# Patient Record
Sex: FEMALE | Race: WHITE | Employment: OTHER | URBAN - METROPOLITAN AREA
[De-identification: names, ages, dates, MRNs, and addresses within clinical notes are randomized per-mention and may not be internally consistent; named-entity substitution may affect disease eponyms.]

---

## 2017-03-06 ENCOUNTER — PATIENT OUTREACH (OUTPATIENT)
Dept: INTERNAL MEDICINE CLINIC | Facility: CLINIC | Age: 72
End: 2017-03-06

## 2017-03-20 ENCOUNTER — OFFICE VISIT (OUTPATIENT)
Dept: INTERNAL MEDICINE CLINIC | Facility: CLINIC | Age: 72
End: 2017-03-20

## 2017-03-20 VITALS
WEIGHT: 170 LBS | BODY MASS INDEX: 27.98 KG/M2 | TEMPERATURE: 98 F | OXYGEN SATURATION: 95 % | RESPIRATION RATE: 16 BRPM | DIASTOLIC BLOOD PRESSURE: 80 MMHG | HEART RATE: 80 BPM | SYSTOLIC BLOOD PRESSURE: 110 MMHG | HEIGHT: 65.5 IN

## 2017-03-20 DIAGNOSIS — S39.012A LUMBOSACRAL STRAIN, INITIAL ENCOUNTER: Primary | ICD-10-CM

## 2017-03-20 DIAGNOSIS — I71.9 AORTIC ANEURYSM WITHOUT RUPTURE, UNSPECIFIED PORTION OF AORTA (HCC): ICD-10-CM

## 2017-03-20 PROCEDURE — 99214 OFFICE O/P EST MOD 30 MIN: CPT | Performed by: STUDENT IN AN ORGANIZED HEALTH CARE EDUCATION/TRAINING PROGRAM

## 2017-03-20 RX ORDER — TRAMADOL HYDROCHLORIDE 50 MG/1
50 TABLET ORAL EVERY 6 HOURS PRN
Qty: 20 TABLET | Refills: 0 | Status: SHIPPED | OUTPATIENT
Start: 2017-03-20 | End: 2017-04-21

## 2017-03-20 RX ORDER — CYCLOBENZAPRINE HCL 10 MG
10 TABLET ORAL NIGHTLY
Qty: 30 TABLET | Refills: 0 | Status: SHIPPED | OUTPATIENT
Start: 2017-03-20 | End: 2017-04-21

## 2017-03-20 RX ORDER — METHYLPREDNISOLONE 4 MG/1
TABLET ORAL
Qty: 1 KIT | Refills: 0 | Status: SHIPPED | OUTPATIENT
Start: 2017-03-20 | End: 2017-04-21 | Stop reason: ALTCHOICE

## 2017-03-20 NOTE — PATIENT INSTRUCTIONS
Exercises to Strengthen Your Lower Back  Strong lower back and abdominal muscles work together to support your spine. The exercises below will help strengthen the lower back. It is important that you begin exercising slowly and increase levels gradually. Standin. Wall squats: Stand with your back against the wall. Move your feet about 12 inches away from the wall. Tighten your stomach muscles, and slowly bend your knees until they are at about a 45 degree angle. Do not go down too far.  Hold about 5 se 4. Abdominal crunch: Perform a pelvic tilt (above) flattening your lower back against the floor. Holding the tension in your abdominal muscles, take another breath and raise your shoulder blades off the ground (this is not a full sit-up).  Keep your head in © 0725-2549 The [address] [place] [address], [address]. All rights reserved. This information is not intended as a substitute for professional medical care. Always follow your healthcare professional's instructions.         Back Ex

## 2017-03-21 NOTE — PROGRESS NOTES
HPI:    Patient ID: Ceci Henry is a 73year old female who presents today for the evaluation of low back pain since Friday morning after sitting/sleeping in the wooden chair for over 8 hours. Low Back Pain  This is a new problem.  Episode onset: Prescriptions:  methylPREDNISolone (MEDROL) 4 MG Oral Tablet Therapy Pack As directed. Disp: 1 kit Rfl: 0   Cyclobenzaprine HCl 10 MG Oral Tab Take 1 tablet (10 mg total) by mouth nightly.  Disp: 30 tablet Rfl: 0   TraMADol HCl 50 MG Oral Tab Take 1 tablet deformity and no laceration. There is bilateral muscular tenderness and muscular spasm upon palpation in lumbosacral area. Straight leg raise test is positive bilaterally. Pain is non-radiating.    Neurological: She is alert and oriented to person, plac

## 2017-04-21 ENCOUNTER — OFFICE VISIT (OUTPATIENT)
Dept: INTERNAL MEDICINE CLINIC | Facility: CLINIC | Age: 72
End: 2017-04-21

## 2017-04-21 ENCOUNTER — LAB ENCOUNTER (OUTPATIENT)
Dept: LAB | Age: 72
End: 2017-04-21
Attending: PHYSICIAN ASSISTANT
Payer: MEDICARE

## 2017-04-21 VITALS
TEMPERATURE: 97 F | RESPIRATION RATE: 16 BRPM | SYSTOLIC BLOOD PRESSURE: 110 MMHG | HEART RATE: 80 BPM | WEIGHT: 170 LBS | HEIGHT: 65.5 IN | DIASTOLIC BLOOD PRESSURE: 78 MMHG | OXYGEN SATURATION: 97 % | BODY MASS INDEX: 27.98 KG/M2

## 2017-04-21 DIAGNOSIS — M47.812 CERVICAL SPINE ARTHRITIS: ICD-10-CM

## 2017-04-21 DIAGNOSIS — Z85.3 HISTORY OF BREAST CANCER: ICD-10-CM

## 2017-04-21 DIAGNOSIS — Z11.59 NEED FOR HEPATITIS C SCREENING TEST: ICD-10-CM

## 2017-04-21 DIAGNOSIS — G47.30 SLEEP APNEA, UNSPECIFIED TYPE: ICD-10-CM

## 2017-04-21 DIAGNOSIS — I71.2 THORACIC AORTIC ANEURYSM WITHOUT RUPTURE (HCC): ICD-10-CM

## 2017-04-21 DIAGNOSIS — I70.0 AORTIC ATHEROSCLEROSIS (HCC): ICD-10-CM

## 2017-04-21 DIAGNOSIS — E03.9 ACQUIRED HYPOTHYROIDISM: ICD-10-CM

## 2017-04-21 DIAGNOSIS — E78.2 MIXED HYPERLIPIDEMIA: ICD-10-CM

## 2017-04-21 DIAGNOSIS — M85.89 OSTEOPENIA OF MULTIPLE SITES: ICD-10-CM

## 2017-04-21 DIAGNOSIS — Z00.00 ENCOUNTER FOR ANNUAL HEALTH EXAMINATION: Primary | ICD-10-CM

## 2017-04-21 DIAGNOSIS — Z85.118 HISTORY OF LUNG CANCER: ICD-10-CM

## 2017-04-21 PROCEDURE — 87086 URINE CULTURE/COLONY COUNT: CPT | Performed by: PHYSICIAN ASSISTANT

## 2017-04-21 PROCEDURE — 80061 LIPID PANEL: CPT | Performed by: PHYSICIAN ASSISTANT

## 2017-04-21 PROCEDURE — 96160 PT-FOCUSED HLTH RISK ASSMT: CPT | Performed by: PHYSICIAN ASSISTANT

## 2017-04-21 PROCEDURE — 36415 COLL VENOUS BLD VENIPUNCTURE: CPT | Performed by: PHYSICIAN ASSISTANT

## 2017-04-21 PROCEDURE — 87186 SC STD MICRODIL/AGAR DIL: CPT | Performed by: PHYSICIAN ASSISTANT

## 2017-04-21 PROCEDURE — 81001 URINALYSIS AUTO W/SCOPE: CPT | Performed by: PHYSICIAN ASSISTANT

## 2017-04-21 PROCEDURE — G0439 PPPS, SUBSEQ VISIT: HCPCS | Performed by: PHYSICIAN ASSISTANT

## 2017-04-21 PROCEDURE — 84439 ASSAY OF FREE THYROXINE: CPT | Performed by: PHYSICIAN ASSISTANT

## 2017-04-21 PROCEDURE — 87077 CULTURE AEROBIC IDENTIFY: CPT | Performed by: PHYSICIAN ASSISTANT

## 2017-04-21 PROCEDURE — 86803 HEPATITIS C AB TEST: CPT | Performed by: PHYSICIAN ASSISTANT

## 2017-04-21 PROCEDURE — 80050 GENERAL HEALTH PANEL: CPT | Performed by: PHYSICIAN ASSISTANT

## 2017-04-21 RX ORDER — LEVOTHYROXINE SODIUM 175 UG/1
TABLET ORAL
Qty: 90 TABLET | Refills: 1 | Status: SHIPPED | OUTPATIENT
Start: 2017-04-21 | End: 2017-04-24 | Stop reason: DRUGHIGH

## 2017-04-21 RX ORDER — ATORVASTATIN CALCIUM 20 MG/1
20 TABLET, FILM COATED ORAL
Qty: 90 TABLET | Refills: 1 | Status: SHIPPED | OUTPATIENT
Start: 2017-04-21 | End: 2017-08-03

## 2017-04-21 NOTE — PROGRESS NOTES
HPI:   Yvonne Ireland is a 73year old female who presents for a MA (Medicare Advantage) Supervisit (Once per calendar year). Doing well  C/o history of sleep apnea- was not able to tolerate CPAP treatment in 1985.  However c/o fatigue and loud snor Sodium (SYNTHROID) 175 MCG Oral Tab TAKE 1 TABLET BEFORE       BREAKFAST   Atorvastatin Calcium 20 MG Oral Tab Take 1 tablet (20 mg total) by mouth once daily. Naproxen Sodium (ALEVE OR) Take by mouth.    aspirin 81 MG Oral Tab Take 1 tablet by mouth marci 110/78 mmHg  Pulse 80  Temp(Src) 96.5 °F (35.8 °C) (Oral)  Resp 16  Ht 65.5\"  Wt 170 lb  BMI 27.85 kg/m2  SpO2 97% Estimated body mass index is 27.85 kg/(m^2) as calculated from the following:    Height as of this encounter: 65.5\".     Weight as of this e Pneumococcal (Prevnar 13) 01/18/2016   • Pneumovax 23 (Lot Mgr) 01/14/2015   • Zoster (Shingles) 02/01/2011       ASSESSMENT AND OTHER RELEVANT CHRONIC CONDITIONS:   Timothy Luke is a 73year old female who presents for a Medicare Assessment.      PLAN Takes levothyroxine as directed, labs checked today cpm     History of breast cancer- utd with mammogram 8/2016, 1 year f/u due 8/2017     History of lung cancer s/p lobectomy 2012- utd with f/u sees Magdy, f/u when due     Osteopenia of multiple sites- had : No    Difficulty walking?: No    Difficulty dressing or bathing?: No    Problems with daily activities? : No    Memory Problems?: No      Fall/Risk Assessment     Do you have 3 or more medical conditions?: 1-Yes    Have you fallen in the last 12 months?: 06/22/2011 94     LDL CHOLESTROL (mg/dL)   Date Value   11/19/2013 63        EKG - w/ Initial Preventative Physical Exam only, or if medically necessary Electrocardiogram date01/02/2016       Colorectal Cancer Screening      Colonoscopy Screen every 10 y injectable drug abusers     Tetanus Toxoid  Only covered with a cut with metal- TD and TDaP Not covered by Medicare Part B No vaccine history found This may be covered with your prescription benefits, but Medicare does not cover unless Medically needed

## 2017-04-21 NOTE — PATIENT INSTRUCTIONS
Have labs drawn, fasting 8-10 hours prior (water before is ok).    Schedule sleep study when convenient    Sidra Logan's SCREENING SCHEDULE   Tests on this list are recommended by your physician but may not be covered, or covered at this frequency, by between ages 73-68) IPPE only No results found for this or any previous visit.  Limited to patients who meet one of the following criteria:   • Men who are 73-68 years old and have smoked more than 100 cigarettes in their lifetime   • Anyone with a family h Annually to at least age 76, and as needed after 76 Mammogram,1 Yr due on 08/15/2017 Please get this Mammogram regularly   Immunizations      Influenza  Covered Annually   Orders placed or performed in visit on 09/19/14  -FLU VAC NO PRSV 4 GHULAM 3 YRS+    Pl (the forms are also available in [facility])  [email or web address]  This link also has information from the [address] [place] [address] regarding Advance Directives.

## 2017-04-24 ENCOUNTER — TELEPHONE (OUTPATIENT)
Dept: INTERNAL MEDICINE CLINIC | Facility: CLINIC | Age: 72
End: 2017-04-24

## 2017-04-24 DIAGNOSIS — E03.9 ACQUIRED HYPOTHYROIDISM: Primary | ICD-10-CM

## 2017-04-24 RX ORDER — LEVOTHYROXINE SODIUM 0.15 MG/1
150 TABLET ORAL
Qty: 30 TABLET | Refills: 1 | Status: SHIPPED | OUTPATIENT
Start: 2017-04-24 | End: 2017-06-08

## 2017-04-24 NOTE — TELEPHONE ENCOUNTER
----- Message from Tyrone Gold PA-C sent at 4/23/2017  1:33 PM CDT -----  Please inform pt: synthroid dose is too high. Decrease to 150mcg daily and recheck TSH in 6 wks.   Otherwise blood work looks great. + urine culture please verify if pt has sym

## 2017-04-24 NOTE — TELEPHONE ENCOUNTER
Pt will call back to discuss. D/w pt results and recommendations. She expressed understanding. Pt denies UTI symptoms therefore no treatment given. Rx and lab order placed.

## 2017-05-24 ENCOUNTER — TELEPHONE (OUTPATIENT)
Dept: INTERNAL MEDICINE CLINIC | Facility: CLINIC | Age: 72
End: 2017-05-24

## 2017-05-24 NOTE — TELEPHONE ENCOUNTER
Pt has been taking a new dose of levothyroxine and wants to know if Eb Chandrakant would have her re-check lab levels? She is almost out of pills.

## 2017-05-24 NOTE — TELEPHONE ENCOUNTER
Pt due for repeat TSH on 6/5/17. Pt advised of this and was informed she does have 1 refill remaining on her current prescription of levothyroxine she just need to notify pharmacy of needed refill. Pt expressed understanding.

## 2017-05-31 ENCOUNTER — OFFICE VISIT (OUTPATIENT)
Dept: SLEEP CENTER | Facility: HOSPITAL | Age: 72
End: 2017-05-31
Attending: PHYSICIAN ASSISTANT
Payer: MEDICARE

## 2017-05-31 PROCEDURE — 95810 POLYSOM 6/> YRS 4/> PARAM: CPT

## 2017-06-02 NOTE — PROCEDURES
[address]       Accredited by the [facility] of [facility] ([facility]ASM)    PATIENT'S NAME:        Ivanna Lujan  ATTENDING PHYSICIAN:   Pancho Blue M.D.   REFERRING PHYSICIAN:   Sarah Oakley PA-C patient had a total of 146 obstructive apneic or hypopneic episodes yielding an overall apnea-hypopnea index of 24. There was worsening of TANNER in the supine position with a supine AHI of 44, a lateral AHI of 10.   The patient did have significant oxyhemogl

## 2017-06-05 NOTE — PROGRESS NOTES
Quick Note:    Pt will be notified by CPAP coordinator and f/u will be scheduled in sleep clinic.  ______

## 2017-06-06 ENCOUNTER — APPOINTMENT (OUTPATIENT)
Dept: LAB | Age: 72
End: 2017-06-06
Attending: PHYSICIAN ASSISTANT
Payer: MEDICARE

## 2017-06-06 DIAGNOSIS — E03.9 ACQUIRED HYPOTHYROIDISM: ICD-10-CM

## 2017-06-06 DIAGNOSIS — M85.89 OSTEOPENIA OF MULTIPLE SITES: ICD-10-CM

## 2017-06-06 PROCEDURE — 36415 COLL VENOUS BLD VENIPUNCTURE: CPT | Performed by: PHYSICIAN ASSISTANT

## 2017-06-06 PROCEDURE — 82306 VITAMIN D 25 HYDROXY: CPT | Performed by: PHYSICIAN ASSISTANT

## 2017-06-06 PROCEDURE — 84443 ASSAY THYROID STIM HORMONE: CPT | Performed by: PHYSICIAN ASSISTANT

## 2017-06-08 ENCOUNTER — TELEPHONE (OUTPATIENT)
Dept: INTERNAL MEDICINE CLINIC | Facility: CLINIC | Age: 72
End: 2017-06-08

## 2017-06-08 DIAGNOSIS — E03.9 ACQUIRED HYPOTHYROIDISM: Primary | ICD-10-CM

## 2017-06-08 RX ORDER — LEVOTHYROXINE SODIUM 0.15 MG/1
150 TABLET ORAL
Qty: 90 TABLET | Refills: 1 | Status: SHIPPED | OUTPATIENT
Start: 2017-06-08 | End: 2017-08-03

## 2017-06-28 ENCOUNTER — OFFICE VISIT (OUTPATIENT)
Dept: SLEEP CENTER | Facility: HOSPITAL | Age: 72
End: 2017-06-28
Attending: INTERNAL MEDICINE
Payer: MEDICARE

## 2017-06-28 PROCEDURE — 95811 POLYSOM 6/>YRS CPAP 4/> PARM: CPT

## 2017-07-05 NOTE — PROCEDURES
[address] [address]       Accredited by the [facility] of [facility] (AASM)    PATIENT'S NAME:        Amanda Crefadumo  ATTENDING PHYSICIAN:   Estela Sosa M.D. REFERRING PHYSICIAN:   Estela Sosa M.D.   SOFIA 27%.    CPAP TITRATION:  The patient was initiated on CPAP at 7 cm of water. On this setting, patient appeared to have control of all obstructive apneic and hypopneic episodes as well as snoring.   While on this setting, patient achieved prolonged periods

## 2017-08-03 DIAGNOSIS — Z12.39 SCREENING FOR MALIGNANT NEOPLASM OF BREAST: Primary | ICD-10-CM

## 2017-08-03 DIAGNOSIS — E03.9 ACQUIRED HYPOTHYROIDISM: ICD-10-CM

## 2017-08-03 DIAGNOSIS — E78.2 MIXED HYPERLIPIDEMIA: ICD-10-CM

## 2017-08-03 RX ORDER — ATORVASTATIN CALCIUM 20 MG/1
20 TABLET, FILM COATED ORAL
Qty: 90 TABLET | Refills: 1 | Status: SHIPPED | OUTPATIENT
Start: 2017-08-03 | End: 2017-12-19

## 2017-08-03 RX ORDER — LEVOTHYROXINE SODIUM 0.15 MG/1
150 TABLET ORAL
Qty: 90 TABLET | Refills: 1 | Status: SHIPPED | OUTPATIENT
Start: 2017-08-03 | End: 2017-12-19

## 2017-08-03 NOTE — TELEPHONE ENCOUNTER
Pt requesting 90 day supply refills of Levothyroxine Sodium 150 MCG Oral Tab and Atorvastatin Calcium 20 MG Oral Tab - send to [place] home delivery on file.    Pt also requesting annual order for mammogram.

## 2017-08-16 ENCOUNTER — HOSPITAL ENCOUNTER (OUTPATIENT)
Dept: MAMMOGRAPHY | Facility: HOSPITAL | Age: 72
Discharge: HOME | End: 2017-08-16
Attending: INTERNAL MEDICINE
Payer: MEDICARE

## 2017-08-16 ENCOUNTER — TELEPHONE (OUTPATIENT)
Dept: INTERNAL MEDICINE CLINIC | Facility: CLINIC | Age: 72
End: 2017-08-16

## 2017-08-16 DIAGNOSIS — Z12.39 SCREENING FOR MALIGNANT NEOPLASM OF BREAST: ICD-10-CM

## 2017-08-16 NOTE — TELEPHONE ENCOUNTER
Spoke with Tim Bloom from mammography and she states she explained to patient that a screening vs diagnostic test were different and the diagnostic test called for a longer appointment time.  She offered to reschedule patient for today at 2pm for diagnostic te

## 2017-08-16 NOTE — TELEPHONE ENCOUNTER
Pt went to [facility] this morning for her mammogram appt, but then noticed that it was for a screening mammogram - she said per her insurance they will not cover the screening, only diagnostic mammograms.  We changed the order right away but pt was to

## 2017-08-31 ENCOUNTER — HOSPITAL ENCOUNTER (OUTPATIENT)
Dept: MAMMOGRAPHY | Facility: HOSPITAL | Age: 72
Discharge: HOME | End: 2017-08-31
Attending: INTERNAL MEDICINE
Payer: MEDICARE

## 2017-08-31 ENCOUNTER — TELEPHONE (OUTPATIENT)
Dept: INTERNAL MEDICINE CLINIC | Facility: CLINIC | Age: 72
End: 2017-08-31

## 2017-08-31 DIAGNOSIS — N63.10 BREAST MASS, RIGHT: Primary | ICD-10-CM

## 2017-08-31 DIAGNOSIS — Z85.3 PERSONAL HISTORY OF MALIGNANT NEOPLASM OF BREAST: ICD-10-CM

## 2017-08-31 PROCEDURE — 76642 ULTRASOUND BREAST LIMITED: CPT | Performed by: INTERNAL MEDICINE

## 2017-08-31 PROCEDURE — 77066 DX MAMMO INCL CAD BI: CPT | Performed by: INTERNAL MEDICINE

## 2017-08-31 PROCEDURE — 77062 BREAST TOMOSYNTHESIS BI: CPT | Performed by: INTERNAL MEDICINE

## 2017-08-31 NOTE — IMAGING NOTE
Asssisted Dr. Ramirez March with recommendation for a right US breast biopsy for nodule. Emotional and educational support provided. Written information provided to Peterson Kam.  Our breast center schedulers will call pt within 72 hours to schedule an appo

## 2017-08-31 NOTE — TELEPHONE ENCOUNTER
Spoke with Dr. Edis Alvarado and he is requesting a US order for breast biopsy of right breast.     CONCLUSION:        1.  There has been interval development of a new mass at the 8:00 position of the right breast, this would be amenable to ultrasound-gu

## 2017-09-01 ENCOUNTER — HOSPITAL ENCOUNTER (OUTPATIENT)
Dept: MAMMOGRAPHY | Facility: HOSPITAL | Age: 72
Discharge: HOME | End: 2017-09-01
Attending: INTERNAL MEDICINE
Payer: MEDICARE

## 2017-09-01 DIAGNOSIS — N63.10 BREAST MASS, RIGHT: ICD-10-CM

## 2017-09-01 PROCEDURE — 77065 DX MAMMO INCL CAD UNI: CPT | Performed by: INTERNAL MEDICINE

## 2017-09-01 PROCEDURE — 88342 IMHCHEM/IMCYTCHM 1ST ANTB: CPT | Performed by: INTERNAL MEDICINE

## 2017-09-01 PROCEDURE — 88377 M/PHMTRC ALYS ISHQUANT/SEMIQ: CPT | Performed by: INTERNAL MEDICINE

## 2017-09-01 PROCEDURE — 88305 TISSUE EXAM BY PATHOLOGIST: CPT | Performed by: INTERNAL MEDICINE

## 2017-09-01 PROCEDURE — 19083 BX BREAST 1ST LESION US IMAG: CPT | Performed by: INTERNAL MEDICINE

## 2017-09-01 PROCEDURE — 88360 TUMOR IMMUNOHISTOCHEM/MANUAL: CPT | Performed by: INTERNAL MEDICINE

## 2017-09-05 ENCOUNTER — TELEPHONE (OUTPATIENT)
Dept: MAMMOGRAPHY | Facility: HOSPITAL | Age: 72
End: 2017-09-05

## 2017-09-05 ENCOUNTER — TELEPHONE (OUTPATIENT)
Dept: INTERNAL MEDICINE CLINIC | Facility: CLINIC | Age: 72
End: 2017-09-05

## 2017-09-05 DIAGNOSIS — N63.10 MASS OF BREAST, RIGHT: Primary | ICD-10-CM

## 2017-09-05 NOTE — TELEPHONE ENCOUNTER
Kali Mann MD  General Surgery  [facility]  Phone: [phone]  Fax: [phone]    Left message for Radiology with Dr. Sugey Biswas recommendations. Advised that they let us know once patient is aware.   Referral placed

## 2017-09-05 NOTE — TELEPHONE ENCOUNTER
Telephoned Shayy Beatty and name,  verified with pt. Notified Shayy Beatty of right US breast biopsy result of DCIS. Explained that we do not know a stage at this point, but explained the Grade.  Also explained that additional tests are pending

## 2017-09-05 NOTE — TELEPHONE ENCOUNTER
VOICEMAIL: pt had rt breast biopsy done and it was positive for cancer, what surgeon would Dr. Anjali Vasquez recommend pt to see, they will call pt back and let her know, did not leave a phone #

## 2017-09-08 ENCOUNTER — TELEPHONE (OUTPATIENT)
Dept: HEMATOLOGY/ONCOLOGY | Facility: HOSPITAL | Age: 72
End: 2017-09-08

## 2017-09-08 NOTE — TELEPHONE ENCOUNTER
Patient phoned back and discussed newly diagnosed breast cancer. Introduced myself and explained my role as the breast navigator.  Explained the role of the physicians on her breast cancer care team. Reviewed over pathology report and discussed the type of

## 2017-09-11 ENCOUNTER — TELEPHONE (OUTPATIENT)
Dept: INTERNAL MEDICINE CLINIC | Facility: CLINIC | Age: 72
End: 2017-09-11

## 2017-09-11 DIAGNOSIS — C50.911 MALIGNANT NEOPLASM OF RIGHT FEMALE BREAST, UNSPECIFIED ESTROGEN RECEPTOR STATUS, UNSPECIFIED SITE OF BREAST (HCC): Primary | ICD-10-CM

## 2017-09-11 NOTE — TELEPHONE ENCOUNTER
Referral placed. LMOVM informing pt referral has been placed and our office will contact her once approved.

## 2017-09-11 NOTE — TELEPHONE ENCOUNTER
Pt needs a referral to be placed to Dr Gamaliel Lorenzo @ [facility], her phone #[phone]. She won't be going to Dr. Serenity Baker.

## 2017-09-13 PROBLEM — G47.33 OSA (OBSTRUCTIVE SLEEP APNEA): Status: ACTIVE | Noted: 2017-09-13

## 2017-09-19 ENCOUNTER — TELEPHONE (OUTPATIENT)
Dept: INTERNAL MEDICINE CLINIC | Facility: CLINIC | Age: 72
End: 2017-09-19

## 2017-09-19 NOTE — TELEPHONE ENCOUNTER
Patient states she needs clearance to proceed with breast surgery at [facility]. She states she has completed labs and EKG at [facility] and only needs H&P. Patient has been scheduled with Paloma Cuellar for tomorrow. She will bring in surgery paperwork.

## 2017-09-19 NOTE — TELEPHONE ENCOUNTER
Patient called and stated she needs to speak to RN asap regarding a letter that is supposed to be sent to [facility] for her surgery. She indicated she needs to speak to RN now.

## 2017-09-20 ENCOUNTER — OFFICE VISIT (OUTPATIENT)
Dept: INTERNAL MEDICINE CLINIC | Facility: CLINIC | Age: 72
End: 2017-09-20

## 2017-09-20 VITALS
SYSTOLIC BLOOD PRESSURE: 124 MMHG | DIASTOLIC BLOOD PRESSURE: 72 MMHG | OXYGEN SATURATION: 97 % | BODY MASS INDEX: 28.13 KG/M2 | TEMPERATURE: 98 F | HEART RATE: 68 BPM | WEIGHT: 175 LBS | HEIGHT: 66 IN | RESPIRATION RATE: 16 BRPM

## 2017-09-20 DIAGNOSIS — G47.33 OSA (OBSTRUCTIVE SLEEP APNEA): ICD-10-CM

## 2017-09-20 DIAGNOSIS — Z01.818 PREOP EXAMINATION: Primary | ICD-10-CM

## 2017-09-20 DIAGNOSIS — Z23 FLU VACCINE NEED: ICD-10-CM

## 2017-09-20 DIAGNOSIS — I71.2 THORACIC AORTIC ANEURYSM WITHOUT RUPTURE (HCC): ICD-10-CM

## 2017-09-20 DIAGNOSIS — C50.411 MALIGNANT NEOPLASM OF UPPER-OUTER QUADRANT OF RIGHT FEMALE BREAST, UNSPECIFIED ESTROGEN RECEPTOR STATUS (HCC): ICD-10-CM

## 2017-09-20 PROCEDURE — 90653 IIV ADJUVANT VACCINE IM: CPT | Performed by: PHYSICIAN ASSISTANT

## 2017-09-20 PROCEDURE — G0008 ADMIN INFLUENZA VIRUS VAC: HCPCS | Performed by: PHYSICIAN ASSISTANT

## 2017-09-20 PROCEDURE — 99214 OFFICE O/P EST MOD 30 MIN: CPT | Performed by: PHYSICIAN ASSISTANT

## 2017-09-20 NOTE — H&P
Shu Greenwood is a 81year old year old female who presents for a pre-operative physical exam.  Patient is to have bilateral mastectomy with reconstruction, to be done by Dr. Michelle Cardoza, Dr. Denisse Tineo and Dr. Radha Selby at [facility] surgical history that includes removal of lung,lobectomy (6.2012); other surgical history; other surgical history; supriya biopsy stereotactic nodule 2 site bilat (2005); chemotherapy (1985); lumpectomy left (2005); lumpectomy right (1677); radiation left (200 analgesics. EXAM:    /72   Pulse 68   Temp 98.4 °F (36.9 °C) (Oral)   Resp 16   Ht 66\"   Wt 175 lb   SpO2 97%   BMI 28.25 kg/m²   GENERAL: well developed, well nourished, in no apparent distress  SKIN: no rashes, no suspicious lesions.   Warm and dr vaccine need- flu shot given today  Malignant neoplasm of upper-outer quadrant of right female breast, unspecified estrogen receptor status (hcc) - managed by surgery and oncology, planning for mastectomy as noted  Thoracic aortic aneurysm without rupture:

## 2017-12-11 ENCOUNTER — MED REC SCAN ONLY (OUTPATIENT)
Dept: INTERNAL MEDICINE CLINIC | Facility: CLINIC | Age: 72
End: 2017-12-11

## 2017-12-19 ENCOUNTER — TELEPHONE (OUTPATIENT)
Dept: INTERNAL MEDICINE CLINIC | Facility: CLINIC | Age: 72
End: 2017-12-19

## 2017-12-19 DIAGNOSIS — E03.9 ACQUIRED HYPOTHYROIDISM: ICD-10-CM

## 2017-12-19 DIAGNOSIS — E78.2 MIXED HYPERLIPIDEMIA: ICD-10-CM

## 2017-12-19 RX ORDER — LEVOTHYROXINE SODIUM 0.15 MG/1
150 TABLET ORAL
Qty: 90 TABLET | Refills: 0 | Status: SHIPPED | OUTPATIENT
Start: 2017-12-19 | End: 2018-05-03

## 2017-12-19 RX ORDER — ATORVASTATIN CALCIUM 20 MG/1
20 TABLET, FILM COATED ORAL
Qty: 90 TABLET | Refills: 0 | Status: SHIPPED | OUTPATIENT
Start: 2017-12-19 | End: 2018-05-03

## 2017-12-19 NOTE — TELEPHONE ENCOUNTER
Patient walked up to the , and is asking if Clemens Shone will approve her RX's: Levothyroxine, Atorvastatin, and Synthroid; qty 90 to be sent to [address] [place] [address]. She is seated outside, and is unable to make an appointment due to going to chemo therapy.

## 2017-12-19 NOTE — TELEPHONE ENCOUNTER
Delgado Goodwin to refill the prescriptions. The pt is to FU in the office when she can some time next month. The pt was informed of this. The pt is aware and will schedule within the next month.

## 2018-01-22 ENCOUNTER — TELEPHONE (OUTPATIENT)
Dept: INTERNAL MEDICINE CLINIC | Facility: CLINIC | Age: 73
End: 2018-01-22

## 2018-01-22 NOTE — TELEPHONE ENCOUNTER
Please call patient  Patient was wondering if she needed to come in for her med check on Tuesday. She would like to combine the med check with her HRA is doing some cancer treatment right know doesn't have a lot of extra time.  Please all before 11:00 toda

## 2018-01-22 NOTE — TELEPHONE ENCOUNTER
Discussed with the pt in detail the insurance will not cover an appointment for an HRA until April. The pt was transferred to the  to schedule the HRA. Ok per Gönyû to wait for an appointment until that time.

## 2018-04-05 ENCOUNTER — MED REC SCAN ONLY (OUTPATIENT)
Dept: INTERNAL MEDICINE CLINIC | Facility: CLINIC | Age: 73
End: 2018-04-05

## 2018-04-20 ENCOUNTER — HOSPITAL ENCOUNTER (OUTPATIENT)
Dept: ULTRASOUND IMAGING | Facility: HOSPITAL | Age: 73
Discharge: HOME | End: 2018-04-20
Attending: OBSTETRICS & GYNECOLOGY
Payer: MEDICARE

## 2018-04-20 DIAGNOSIS — N94.89 ADNEXAL MASS: ICD-10-CM

## 2018-04-20 DIAGNOSIS — Z85.3 HX: BREAST CANCER: ICD-10-CM

## 2018-04-20 PROCEDURE — 76856 US EXAM PELVIC COMPLETE: CPT | Performed by: OBSTETRICS & GYNECOLOGY

## 2018-04-20 PROCEDURE — 76830 TRANSVAGINAL US NON-OB: CPT | Performed by: OBSTETRICS & GYNECOLOGY

## 2018-04-23 ENCOUNTER — LAB ENCOUNTER (OUTPATIENT)
Dept: LAB | Age: 73
End: 2018-04-23
Attending: INTERNAL MEDICINE
Payer: MEDICARE

## 2018-04-23 DIAGNOSIS — E03.9 ACQUIRED HYPOTHYROIDISM: ICD-10-CM

## 2018-04-23 DIAGNOSIS — E78.2 MIXED HYPERLIPIDEMIA: ICD-10-CM

## 2018-04-23 PROCEDURE — 81001 URINALYSIS AUTO W/SCOPE: CPT | Performed by: INTERNAL MEDICINE

## 2018-04-23 PROCEDURE — 80053 COMPREHEN METABOLIC PANEL: CPT | Performed by: INTERNAL MEDICINE

## 2018-04-23 PROCEDURE — 36415 COLL VENOUS BLD VENIPUNCTURE: CPT | Performed by: INTERNAL MEDICINE

## 2018-04-23 PROCEDURE — 80061 LIPID PANEL: CPT | Performed by: INTERNAL MEDICINE

## 2018-04-25 ENCOUNTER — OFFICE VISIT (OUTPATIENT)
Dept: INTERNAL MEDICINE CLINIC | Facility: CLINIC | Age: 73
End: 2018-04-25

## 2018-04-25 VITALS
RESPIRATION RATE: 16 BRPM | DIASTOLIC BLOOD PRESSURE: 78 MMHG | TEMPERATURE: 98 F | WEIGHT: 150 LBS | HEIGHT: 66 IN | HEART RATE: 70 BPM | SYSTOLIC BLOOD PRESSURE: 120 MMHG | OXYGEN SATURATION: 98 % | BODY MASS INDEX: 24.11 KG/M2

## 2018-04-25 DIAGNOSIS — M47.812 CERVICAL SPINE ARTHRITIS: ICD-10-CM

## 2018-04-25 DIAGNOSIS — E78.2 MIXED HYPERLIPIDEMIA: ICD-10-CM

## 2018-04-25 DIAGNOSIS — I71.2 THORACIC AORTIC ANEURYSM WITHOUT RUPTURE (HCC): ICD-10-CM

## 2018-04-25 DIAGNOSIS — G47.33 OSA (OBSTRUCTIVE SLEEP APNEA): ICD-10-CM

## 2018-04-25 DIAGNOSIS — Z00.00 ENCOUNTER FOR ANNUAL HEALTH EXAMINATION: Primary | ICD-10-CM

## 2018-04-25 DIAGNOSIS — J44.9 CHRONIC OBSTRUCTIVE PULMONARY DISEASE, UNSPECIFIED COPD TYPE (HCC): ICD-10-CM

## 2018-04-25 DIAGNOSIS — Z85.118 HISTORY OF LUNG CANCER: ICD-10-CM

## 2018-04-25 DIAGNOSIS — C50.411 MALIGNANT NEOPLASM OF UPPER-OUTER QUADRANT OF RIGHT BREAST IN FEMALE, ESTROGEN RECEPTOR NEGATIVE (HCC): ICD-10-CM

## 2018-04-25 DIAGNOSIS — E03.9 ACQUIRED HYPOTHYROIDISM: ICD-10-CM

## 2018-04-25 DIAGNOSIS — Z17.1 MALIGNANT NEOPLASM OF UPPER-OUTER QUADRANT OF RIGHT BREAST IN FEMALE, ESTROGEN RECEPTOR NEGATIVE (HCC): ICD-10-CM

## 2018-04-25 DIAGNOSIS — M85.89 OSTEOPENIA OF MULTIPLE SITES: ICD-10-CM

## 2018-04-25 DIAGNOSIS — D05.10 DUCTAL CARCINOMA IN SITU (DCIS) OF BREAST, UNSPECIFIED LATERALITY: ICD-10-CM

## 2018-04-25 PROCEDURE — 96160 PT-FOCUSED HLTH RISK ASSMT: CPT | Performed by: PHYSICIAN ASSISTANT

## 2018-04-25 PROCEDURE — G0439 PPPS, SUBSEQ VISIT: HCPCS | Performed by: PHYSICIAN ASSISTANT

## 2018-04-25 RX ORDER — OMEPRAZOLE 20 MG/1
CAPSULE, DELAYED RELEASE ORAL
COMMUNITY
Start: 2018-02-08 | End: 2019-04-08

## 2018-04-25 NOTE — PATIENT INSTRUCTIONS
Gissell Logan's SCREENING SCHEDULE   Tests on this list are recommended by your physician but may not be covered, or covered at this frequency, by your insurer. Please check with your insurance carrier before scheduling to verify coverage.    Digna Flores aneurysm screening (once between ages 73-68) IPPE only No results found for this or any previous visit.  Limited to patients who meet one of the following criteria:   • Men who are 73-68 years old and have smoked more than 100 cigarettes in their lifetime age 76, and as needed after 76 Mammogram,1 Yr due on 09/01/2018 Please get this Mammogram regularly   Immunizations      Influenza  Covered Annually   Orders placed or performed in visit on 09/19/14  -FLU VAC NO PRSV 4 GHULAM 3 YRS+    Please get every year available in [address])  [email or web address]  This link also has information from the [address] [place] [address] regarding Advance Directives.

## 2018-04-25 NOTE — PROGRESS NOTES
HPI:   Jolynn Montes is a 81year old female who presents for a MA (Medicare Advantage) Supervisit (Once per calendar year). No complaints.  Undergoing chemo for breast cancer with Westley Rodrigues, doing fairly well  Her last annual assessment has been over 1 as Breast Navigator (ONCOLOGY)    Patient Active Problem List:     Aortic aneurysm ([address]- stable on CT from 8/15/17     Hyperlipidemia- on atorvastatin, LDL at goal, tolerates statin well no side effects     Malignant neoplasm of upper-outer quadrant of ri tablet by mouth daily. Multiple Vitamin (DAILY MULTIVITAMIN OR) Take 1 Tab by mouth daily. MEDICAL INFORMATION:   She  has a past medical history of Aortic aneurysm ([facility] [facility] Breast CA ([facility] [facility] (1985); Lipid screening (5/7/2012);  Malignant neoplasm of charlene following:    Height as of this encounter: 66\". Weight as of this encounter: 150 lb.     Medicare Hearing Assessment  (Required for AWV/SWV)    Whispered Voice       Visual Acuity                           General Appearance:  Alert, cooperative, no dis visit:    Encounter for annual health examination    Mixed hyperlipidemia    TANNER (obstructive sleep apnea)    Cervical spine arthritis (HCC)    Thoracic aortic aneurysm without rupture (HCC)    Osteopenia of multiple sites    Chronic obstructive pulmonary 4/25/2018     General Health     In the past six months, have you lost more than 10 pounds without trying?: 2 - No  Has your appetite been poor?: No  How does the patient maintain a good energy level?: Other  How would you describe your daily physical acti risk There are no preventive care reminders to display for this patient. Update Health Maintenance if applicable    Chlamydia  Annually if high risk No results found for: CHLAMYDIA No flowsheet data found.     Screening Mammogram      Mammogram Annually to

## 2018-04-27 PROBLEM — D05.10 DCIS (DUCTAL CARCINOMA IN SITU): Status: ACTIVE | Noted: 2017-10-30

## 2018-05-03 DIAGNOSIS — E03.9 ACQUIRED HYPOTHYROIDISM: ICD-10-CM

## 2018-05-03 DIAGNOSIS — E78.2 MIXED HYPERLIPIDEMIA: ICD-10-CM

## 2018-05-03 RX ORDER — ATORVASTATIN CALCIUM 20 MG/1
20 TABLET, FILM COATED ORAL
Qty: 90 TABLET | Refills: 1 | Status: SHIPPED | OUTPATIENT
Start: 2018-05-03 | End: 2018-05-07

## 2018-05-03 RX ORDER — LEVOTHYROXINE SODIUM 0.15 MG/1
150 TABLET ORAL
Qty: 90 TABLET | Refills: 1 | Status: SHIPPED | OUTPATIENT
Start: 2018-05-03 | End: 2018-05-07

## 2018-05-07 ENCOUNTER — TELEPHONE (OUTPATIENT)
Dept: INTERNAL MEDICINE CLINIC | Facility: CLINIC | Age: 73
End: 2018-05-07

## 2018-05-07 DIAGNOSIS — E03.9 ACQUIRED HYPOTHYROIDISM: ICD-10-CM

## 2018-05-07 DIAGNOSIS — E78.2 MIXED HYPERLIPIDEMIA: ICD-10-CM

## 2018-05-07 RX ORDER — LEVOTHYROXINE SODIUM 0.15 MG/1
150 TABLET ORAL
Qty: 90 TABLET | Refills: 1 | Status: SHIPPED | OUTPATIENT
Start: 2018-05-07 | End: 2019-01-04

## 2018-05-07 RX ORDER — ATORVASTATIN CALCIUM 20 MG/1
20 TABLET, FILM COATED ORAL
Qty: 90 TABLET | Refills: 1 | Status: SHIPPED | OUTPATIENT
Start: 2018-05-07 | End: 2019-01-04

## 2018-05-07 NOTE — TELEPHONE ENCOUNTER
New prescription request from Debi Gutierrez for atorvastatin 20 MG and Levothyrns 0.15mg tab. Form in triage.

## 2018-06-04 ENCOUNTER — TELEPHONE (OUTPATIENT)
Dept: INTERNAL MEDICINE CLINIC | Facility: CLINIC | Age: 73
End: 2018-06-04

## 2018-07-27 ENCOUNTER — OFFICE VISIT (OUTPATIENT)
Dept: INTERNAL MEDICINE CLINIC | Facility: CLINIC | Age: 73
End: 2018-07-27
Payer: MEDICARE

## 2018-07-27 VITALS
HEART RATE: 69 BPM | RESPIRATION RATE: 16 BRPM | WEIGHT: 143 LBS | TEMPERATURE: 98 F | HEIGHT: 66 IN | BODY MASS INDEX: 22.98 KG/M2 | DIASTOLIC BLOOD PRESSURE: 68 MMHG | SYSTOLIC BLOOD PRESSURE: 112 MMHG

## 2018-07-27 DIAGNOSIS — Z17.1 MALIGNANT NEOPLASM OF UPPER-OUTER QUADRANT OF RIGHT BREAST IN FEMALE, ESTROGEN RECEPTOR NEGATIVE (HCC): ICD-10-CM

## 2018-07-27 DIAGNOSIS — J34.89 RHINORRHEA: ICD-10-CM

## 2018-07-27 DIAGNOSIS — C50.411 MALIGNANT NEOPLASM OF UPPER-OUTER QUADRANT OF RIGHT BREAST IN FEMALE, ESTROGEN RECEPTOR NEGATIVE (HCC): ICD-10-CM

## 2018-07-27 DIAGNOSIS — T28.0XXA BURN OF ORAL MUCOSA: Primary | ICD-10-CM

## 2018-07-27 PROCEDURE — 99214 OFFICE O/P EST MOD 30 MIN: CPT | Performed by: PHYSICIAN ASSISTANT

## 2018-07-27 RX ORDER — CHLORHEXIDINE GLUCONATE 0.12 MG/ML
RINSE ORAL
Qty: 118 ML | Refills: 1 | Status: SHIPPED | OUTPATIENT
Start: 2018-07-27 | End: 2019-04-08

## 2018-07-27 RX ORDER — FLUTICASONE PROPIONATE 50 MCG
1 SPRAY, SUSPENSION (ML) NASAL DAILY
Qty: 1 BOTTLE | Refills: 0 | Status: SHIPPED | OUTPATIENT
Start: 2018-07-27 | End: 2019-04-08

## 2018-07-27 NOTE — PROGRESS NOTES
HPI:    Patient ID: Rolly Ramirez is a 81year old female.   Patient presents with:  Lesion: room 3, pt ate a piece of pizza that was too hot and top of mouth got burned and hasn't healed, feels like a scab     HPI  States she has had a scab on the roof Nursing note and vitals reviewed. Constitutional: She is oriented to person, place, and time. She appears well-developed and well-nourished. No distress. HENT:   Head: Normocephalic and atraumatic. Nose: Nose normal. No mucosal edema.  Right sinus exh

## 2018-07-30 NOTE — PATIENT INSTRUCTIONS
Use mouth rinse for 7 days, or until mouth is healed completely.  Follow up if not resolved within 7 days  Try flonase for drainage, use once a day

## 2018-08-24 ENCOUNTER — TELEPHONE (OUTPATIENT)
Dept: INTERNAL MEDICINE CLINIC | Facility: CLINIC | Age: 73
End: 2018-08-24

## 2018-08-24 NOTE — TELEPHONE ENCOUNTER
Order dated 2017 by Dr. Sri Guallpa for [facility] [phone]. Pt given this information.

## 2018-10-10 ENCOUNTER — HOSPITAL ENCOUNTER (OUTPATIENT)
Dept: ULTRASOUND IMAGING | Facility: HOSPITAL | Age: 73
Discharge: HOME | End: 2018-10-10
Attending: OBSTETRICS & GYNECOLOGY
Payer: MEDICARE

## 2018-10-10 DIAGNOSIS — N94.89 ADNEXAL MASS: ICD-10-CM

## 2018-10-10 PROCEDURE — 76856 US EXAM PELVIC COMPLETE: CPT | Performed by: OBSTETRICS & GYNECOLOGY

## 2018-10-10 PROCEDURE — 76830 TRANSVAGINAL US NON-OB: CPT | Performed by: OBSTETRICS & GYNECOLOGY

## 2018-10-26 ENCOUNTER — MED REC SCAN ONLY (OUTPATIENT)
Dept: INTERNAL MEDICINE CLINIC | Facility: CLINIC | Age: 73
End: 2018-10-26

## 2019-01-04 DIAGNOSIS — E78.2 MIXED HYPERLIPIDEMIA: ICD-10-CM

## 2019-01-04 DIAGNOSIS — E03.9 ACQUIRED HYPOTHYROIDISM: ICD-10-CM

## 2019-01-04 RX ORDER — ATORVASTATIN CALCIUM 20 MG/1
20 TABLET, FILM COATED ORAL
Qty: 90 TABLET | Refills: 1 | Status: SHIPPED | OUTPATIENT
Start: 2019-01-04 | End: 2019-07-19

## 2019-01-04 RX ORDER — LEVOTHYROXINE SODIUM 0.15 MG/1
150 TABLET ORAL
Qty: 90 TABLET | Refills: 1 | Status: SHIPPED | OUTPATIENT
Start: 2019-01-04 | End: 2019-07-19

## 2019-01-04 NOTE — TELEPHONE ENCOUNTER
Patient walked up to the , and is requesting Levothyroxine and Atorvastatin medications qty 3 month supply to be sent to [facility] order.

## 2019-01-29 ENCOUNTER — TELEPHONE (OUTPATIENT)
Dept: INTERNAL MEDICINE CLINIC | Facility: CLINIC | Age: 74
End: 2019-01-29

## 2019-01-29 NOTE — TELEPHONE ENCOUNTER
Did confirm with the pt Rx for atorvastatin 20mg and Levothyroxine 150mcg were both sent to [address] [place] [address] on 1/4/2019. Receipt confirmed by the pharmacy on 12:31 PM.     The pt verbalized understanding and will FU with Aeziana about the refills.

## 2019-01-29 NOTE — TELEPHONE ENCOUNTER
Patient called YAIR on VM wanting a call back because she was here 10 days ago, and was told her RX was faxed to [place] [facility], but is now having trouble with [employer]. Please call her back.

## 2019-03-27 ENCOUNTER — PATIENT OUTREACH (OUTPATIENT)
Dept: INTERNAL MEDICINE CLINIC | Facility: CLINIC | Age: 74
End: 2019-03-27

## 2019-04-08 ENCOUNTER — OFFICE VISIT (OUTPATIENT)
Dept: INTERNAL MEDICINE CLINIC | Facility: CLINIC | Age: 74
End: 2019-04-08
Payer: MEDICARE

## 2019-04-08 VITALS
SYSTOLIC BLOOD PRESSURE: 114 MMHG | DIASTOLIC BLOOD PRESSURE: 66 MMHG | RESPIRATION RATE: 16 BRPM | HEIGHT: 66 IN | HEART RATE: 80 BPM | TEMPERATURE: 99 F | BODY MASS INDEX: 21.86 KG/M2 | WEIGHT: 136 LBS

## 2019-04-08 DIAGNOSIS — J22 ACUTE LOWER RESPIRATORY INFECTION: Primary | ICD-10-CM

## 2019-04-08 PROCEDURE — 99213 OFFICE O/P EST LOW 20 MIN: CPT | Performed by: PHYSICIAN ASSISTANT

## 2019-04-08 RX ORDER — AMOXICILLIN AND CLAVULANATE POTASSIUM 875; 125 MG/1; MG/1
1 TABLET, FILM COATED ORAL 2 TIMES DAILY
Qty: 20 TABLET | Refills: 0 | Status: SHIPPED | OUTPATIENT
Start: 2019-04-08 | End: 2019-04-18

## 2019-04-08 RX ORDER — BENZONATATE 200 MG/1
200 CAPSULE ORAL 3 TIMES DAILY PRN
Qty: 20 CAPSULE | Refills: 0 | Status: SHIPPED | OUTPATIENT
Start: 2019-04-08 | End: 2019-04-30 | Stop reason: ALTCHOICE

## 2019-04-08 NOTE — PROGRESS NOTES
HPI:   Choco Cunha is a 72year old female who presents for upper respiratory symptoms for  10  days. Patient reports cough, chest congestion, worse with lying down, some shortness of breath, sputum is darker and slightly thick.  Patient denies [place] Payment Alcohol/week: 0.0 oz      Comment: 1 drink on New Year's Dayanna    Drug use: No        REVIEW OF SYSTEMS:   GENERAL: feeling poorly.    SKIN: no rashes  EYES: denies blurred vision, eye irritation or discharge  HEENT: congested; no difficulty swallowing or

## 2019-04-26 ENCOUNTER — MED REC SCAN ONLY (OUTPATIENT)
Dept: INTERNAL MEDICINE CLINIC | Facility: CLINIC | Age: 74
End: 2019-04-26

## 2019-04-30 ENCOUNTER — OFFICE VISIT (OUTPATIENT)
Dept: INTERNAL MEDICINE CLINIC | Facility: CLINIC | Age: 74
End: 2019-04-30
Payer: MEDICARE

## 2019-04-30 VITALS
WEIGHT: 136 LBS | SYSTOLIC BLOOD PRESSURE: 136 MMHG | HEART RATE: 70 BPM | BODY MASS INDEX: 21.86 KG/M2 | OXYGEN SATURATION: 96 % | TEMPERATURE: 98 F | RESPIRATION RATE: 16 BRPM | HEIGHT: 66 IN | DIASTOLIC BLOOD PRESSURE: 80 MMHG

## 2019-04-30 DIAGNOSIS — C50.511 MALIGNANT NEOPLASM OF LOWER-OUTER QUADRANT OF RIGHT BREAST OF FEMALE, ESTROGEN RECEPTOR NEGATIVE (HCC): ICD-10-CM

## 2019-04-30 DIAGNOSIS — Z17.1 MALIGNANT NEOPLASM OF LOWER-OUTER QUADRANT OF RIGHT BREAST OF FEMALE, ESTROGEN RECEPTOR NEGATIVE (HCC): ICD-10-CM

## 2019-04-30 DIAGNOSIS — Z00.00 ENCOUNTER FOR ANNUAL HEALTH EXAMINATION: Primary | ICD-10-CM

## 2019-04-30 DIAGNOSIS — J34.0 NASAL SEPTUM ULCERATION: ICD-10-CM

## 2019-04-30 DIAGNOSIS — C34.90 MALIGNANT NEOPLASM OF LUNG, UNSPECIFIED LATERALITY, UNSPECIFIED PART OF LUNG (HCC): ICD-10-CM

## 2019-04-30 DIAGNOSIS — E03.9 ACQUIRED HYPOTHYROIDISM: ICD-10-CM

## 2019-04-30 DIAGNOSIS — G47.33 OSA (OBSTRUCTIVE SLEEP APNEA): ICD-10-CM

## 2019-04-30 DIAGNOSIS — I71.2 THORACIC AORTIC ANEURYSM WITHOUT RUPTURE (HCC): ICD-10-CM

## 2019-04-30 DIAGNOSIS — M47.812 FACET ARTHRITIS OF CERVICAL REGION: ICD-10-CM

## 2019-04-30 DIAGNOSIS — E78.2 MIXED HYPERLIPIDEMIA: ICD-10-CM

## 2019-04-30 DIAGNOSIS — J44.9 CHRONIC OBSTRUCTIVE PULMONARY DISEASE, UNSPECIFIED COPD TYPE (HCC): ICD-10-CM

## 2019-04-30 DIAGNOSIS — D05.10 DUCTAL CARCINOMA IN SITU (DCIS) OF BREAST, UNSPECIFIED LATERALITY: ICD-10-CM

## 2019-04-30 DIAGNOSIS — N18.30 CKD (CHRONIC KIDNEY DISEASE) STAGE 3, GFR 30-59 ML/MIN (HCC): ICD-10-CM

## 2019-04-30 DIAGNOSIS — Z91.89 AT RISK FOR INFECTIOUS DISEASE DUE TO RECENT FOREIGN TRAVEL: ICD-10-CM

## 2019-04-30 DIAGNOSIS — Z85.118 HISTORY OF LUNG CANCER: ICD-10-CM

## 2019-04-30 DIAGNOSIS — M85.89 OSTEOPENIA OF MULTIPLE SITES: ICD-10-CM

## 2019-04-30 PROCEDURE — 96160 PT-FOCUSED HLTH RISK ASSMT: CPT | Performed by: PHYSICIAN ASSISTANT

## 2019-04-30 PROCEDURE — 99397 PER PM REEVAL EST PAT 65+ YR: CPT | Performed by: PHYSICIAN ASSISTANT

## 2019-04-30 PROCEDURE — G0439 PPPS, SUBSEQ VISIT: HCPCS | Performed by: PHYSICIAN ASSISTANT

## 2019-04-30 NOTE — PROGRESS NOTES
HPI:   Yvonne Ireland is a 72year old female who presents for a MA (Medicare Advantage) Supervisit (Once per calendar year). Has had chronic nasal septum ulceration x 2 years, saw ENT specialist who gave her a cream that did not work.  C/o constant Use      Smoking status: Former Smoker        Types: Cigarettes        Quit date: 1998        Years since quittin.3      Smokeless tobacco: Never Used         CAGE Alcohol screening   Sloane Craw was screened for Alcohol abuse and had a scor Levothyroxine Sodium 150 MCG Oral Tab Take 1 tablet (150 mcg total) by mouth before breakfast.   aspirin 81 MG Oral Tab Take 1 tablet by mouth daily. Multiple Vitamin (DAILY MULTIVITAMIN OR) Take 1 Tab by mouth daily.       MEDICAL INFORMATION:   She  h history  ALL/ASTHMA: denies hx of allergy or asthma    EXAM:   /80   Pulse 70   Temp 98.3 °F (36.8 °C) (Oral)   Resp 16   Ht 66\"   Wt 136 lb   SpO2 96%   Breastfeeding?  No   BMI 21.95 kg/m²  Estimated body mass index is 21.95 kg/m² as calculated fro and older ([address]3283) 09/20/2017   • FLUZONE 3 Yrs+ Quad Prsv Free 0.5 ml (84129) 09/19/2014   • Fluvirin, 3 Years & >, Im 09/19/2014   • Fluzone Vaccine Medicare () 10/04/2018   • Influenza 09/25/2013, 11/09/2016, 09/20/2017   • Pneumococcal (Prevnar 13) URINALYSIS WITH CULTURE REFLEX; Future    TANNER (obstructive sleep apnea)    Malignant neoplasm of lung, unspecified laterality, unspecified part of lung (HCC)    Acquired hypothyroidism  -     TSH W REFLEX TO FREE T4; Future    Osteopenia of multiple sit GLUCOSE (mg/dL)   Date Value   11/19/2013 78   03/09/2011 90          Cardiovascular Disease Screening     LDL Annually LDL Cholesterol (mg/dL)   Date Value   04/23/2018 34     LDL-CHOLESTEROL (mg/dL (calc))   Date Value   06/22/2011 94     LDL CHOLEST who received Factor VIII or IX concentrates   Clients of institutions for the mentally retarded   Persons who live in the same house as a HepB virus carrier   Homosexual men   Illicit injectable drug abusers     Tetanus Toxoid  Only covered with a cut with

## 2019-04-30 NOTE — PATIENT INSTRUCTIONS
Try vaseline on nasal ulcer twice daily, apply with qtip  If not improved, try saline nasal spray, twice daily to each nostril   May also consider children's Claritin over-the-counter, once daily in the evening  Itzel Logan's SCREENING SCHEDULE   Nesha non-screening if indicated for medical reasons Electrocardiogram date Routine EKG is not a screening covered service except at the [place] to Medicare Visit    Abdominal aortic aneurysm screening (once between ages 73-68) IPPE only No results found for Carmen Jaimes patient. Chlamydia  Annually if high risk No results found for: CHLAMYDIA No flowsheet data found.     Screening Mammogram      Mammogram    Recommend Annually to at least age 76, and as needed after 76 Mammogram due on 09/01/2018 Please get this Mammog of Advance Directives. It also has the State forms available on it's website for anyone to review and print using their home computer and printer. (the forms are also available in [address])  [email or web address]  This link also has information from the Am

## 2019-05-06 ENCOUNTER — TELEPHONE (OUTPATIENT)
Dept: INTERNAL MEDICINE CLINIC | Facility: CLINIC | Age: 74
End: 2019-05-06

## 2019-05-06 NOTE — TELEPHONE ENCOUNTER
Patient called and stated she had seen Sandra Nash, and recovered from cancer; however, she wants to go to [place], [place], but Sandra Nash stated she will get back to the patient in regards if she can go due to the measles outbreak.  Patient would like a call back to know

## 2019-05-06 NOTE — TELEPHONE ENCOUNTER
Abbi Felton recommends completing labs as advised to check for immunity status. Labs have been ordered.      TCB

## 2019-05-06 NOTE — TELEPHONE ENCOUNTER
Second attempt to reach patient. Left detailed message with recommendations. Advised she can call the office for any questions.

## 2019-05-08 ENCOUNTER — LAB ENCOUNTER (OUTPATIENT)
Dept: LAB | Age: 74
End: 2019-05-08
Attending: PHYSICIAN ASSISTANT
Payer: MEDICARE

## 2019-05-08 DIAGNOSIS — E03.9 ACQUIRED HYPOTHYROIDISM: ICD-10-CM

## 2019-05-08 DIAGNOSIS — E78.2 MIXED HYPERLIPIDEMIA: ICD-10-CM

## 2019-05-08 DIAGNOSIS — Z91.89 AT RISK FOR INFECTIOUS DISEASE DUE TO RECENT FOREIGN TRAVEL: ICD-10-CM

## 2019-05-08 PROCEDURE — 84443 ASSAY THYROID STIM HORMONE: CPT

## 2019-05-08 PROCEDURE — 80061 LIPID PANEL: CPT

## 2019-05-08 PROCEDURE — 86765 RUBEOLA ANTIBODY: CPT

## 2019-05-08 PROCEDURE — 81003 URINALYSIS AUTO W/O SCOPE: CPT

## 2019-05-08 PROCEDURE — 86762 RUBELLA ANTIBODY: CPT

## 2019-05-08 PROCEDURE — 36415 COLL VENOUS BLD VENIPUNCTURE: CPT

## 2019-05-08 PROCEDURE — 85025 COMPLETE CBC W/AUTO DIFF WBC: CPT

## 2019-05-08 PROCEDURE — 80053 COMPREHEN METABOLIC PANEL: CPT

## 2019-05-08 PROCEDURE — 86735 MUMPS ANTIBODY: CPT

## 2019-06-07 ENCOUNTER — OFFICE VISIT (OUTPATIENT)
Dept: INTERNAL MEDICINE CLINIC | Facility: CLINIC | Age: 74
End: 2019-06-07
Payer: MEDICARE

## 2019-06-07 VITALS
TEMPERATURE: 98 F | DIASTOLIC BLOOD PRESSURE: 74 MMHG | RESPIRATION RATE: 16 BRPM | OXYGEN SATURATION: 97 % | HEIGHT: 66 IN | SYSTOLIC BLOOD PRESSURE: 122 MMHG | HEART RATE: 70 BPM | WEIGHT: 137 LBS | BODY MASS INDEX: 22.02 KG/M2

## 2019-06-07 DIAGNOSIS — H65.192 OTHER NON-RECURRENT ACUTE NONSUPPURATIVE OTITIS MEDIA OF LEFT EAR: Primary | ICD-10-CM

## 2019-06-07 DIAGNOSIS — H10.11 SEASONAL AND PERENNIAL ALLERGIC RHINOCONJUNCTIVITIS OF RIGHT EYE: ICD-10-CM

## 2019-06-07 DIAGNOSIS — J30.2 SEASONAL AND PERENNIAL ALLERGIC RHINOCONJUNCTIVITIS OF RIGHT EYE: ICD-10-CM

## 2019-06-07 DIAGNOSIS — J30.89 SEASONAL AND PERENNIAL ALLERGIC RHINOCONJUNCTIVITIS OF RIGHT EYE: ICD-10-CM

## 2019-06-07 PROCEDURE — 99213 OFFICE O/P EST LOW 20 MIN: CPT | Performed by: NURSE PRACTITIONER

## 2019-06-07 RX ORDER — AMOXICILLIN AND CLAVULANATE POTASSIUM 875; 125 MG/1; MG/1
1 TABLET, FILM COATED ORAL 2 TIMES DAILY
Qty: 20 TABLET | Refills: 0 | Status: SHIPPED | OUTPATIENT
Start: 2019-06-07 | End: 2019-06-17

## 2019-06-07 NOTE — PATIENT INSTRUCTIONS
2 eye drops twice a day- [facility]   Take antibiotic with food as directed  Rest   Salt water gargles

## 2019-06-07 NOTE — PROGRESS NOTES
HPI:    Patient ID: Peterson Kam is a 72year old female. Patient presents with:  Ear Pain: sore throat, left ear pain, watery eyes      Patient got home on Tuesday morning from a trip in [facility].  She stated developing ear pain on Wednesday and has Laterality Date   • CHEMOTHERAPY  1985    right breast ca   • LUMPECTOMY LEFT  2005   • LUMPECTOMY RIGHT  1985   • SIM BIOPSY STEREOTACTIC NODULE 2 SITE BILAT  2005   • MASTECTOMY MODIFIED RADICAL Bilateral 10/19/2017    with immediate reconstruction   • O pain, difficulty breathing    Lab Results   Component Value Date    GLU 84 05/08/2019    BUN 18 05/08/2019    BUNCREA 17.6 05/08/2019    CREATSERUM 1.02 05/08/2019    ANIONGAP 3 05/08/2019    GFR 50 (L) 04/21/2017    GFRNAA 55 (L) 05/08/2019    GFRAA 64 05 posterior oropharyngeal erythema present. No posterior oropharyngeal edema. Eyes: Pupils are equal, round, and reactive to light. EOM are normal. Right conjunctiva is injected. Right conjunctiva has no hemorrhage. Left conjunctiva is not injected.  Left c

## 2019-06-11 ENCOUNTER — OFFICE VISIT (OUTPATIENT)
Dept: INTERNAL MEDICINE CLINIC | Facility: CLINIC | Age: 74
End: 2019-06-11
Payer: MEDICARE

## 2019-06-11 ENCOUNTER — TELEPHONE (OUTPATIENT)
Dept: INTERNAL MEDICINE CLINIC | Facility: CLINIC | Age: 74
End: 2019-06-11

## 2019-06-11 VITALS
SYSTOLIC BLOOD PRESSURE: 110 MMHG | BODY MASS INDEX: 22.02 KG/M2 | RESPIRATION RATE: 16 BRPM | OXYGEN SATURATION: 97 % | HEART RATE: 78 BPM | HEIGHT: 66 IN | WEIGHT: 137 LBS | DIASTOLIC BLOOD PRESSURE: 68 MMHG | TEMPERATURE: 98 F

## 2019-06-11 DIAGNOSIS — B37.3 VAGINAL YEAST INFECTION: Primary | ICD-10-CM

## 2019-06-11 DIAGNOSIS — R30.0 DYSURIA: ICD-10-CM

## 2019-06-11 PROCEDURE — 99213 OFFICE O/P EST LOW 20 MIN: CPT | Performed by: NURSE PRACTITIONER

## 2019-06-11 PROCEDURE — 81003 URINALYSIS AUTO W/O SCOPE: CPT | Performed by: NURSE PRACTITIONER

## 2019-06-11 RX ORDER — FLUCONAZOLE 150 MG/1
150 TABLET ORAL DAILY
Qty: 3 TABLET | Refills: 0 | Status: SHIPPED | OUTPATIENT
Start: 2019-06-11 | End: 2019-06-24 | Stop reason: ALTCHOICE

## 2019-06-11 NOTE — TELEPHONE ENCOUNTER
Per Tiim Christensen, have her hold statin while taking diflucan. Called patient and left detailed message with above instructions.  Advised she call the office with any questions

## 2019-06-11 NOTE — PROGRESS NOTES
HPI:    Patient ID: Nathalie Crews is a 72year old female. Patient presents with:  Urinary: burning during urination, no discharge      Patient is on currently on Augmentin.  She started developing vaginal itchiness and burning with urination yesterd History    Socioeconomic History      Marital status:       Spouse name: Not on file      Number of children: Not on file      Years of education: Not on file      Highest education level: Not on file    Tobacco Use      Smoking status: [employer] GLOBULIN 3.5 05/08/2019    ALBGLOBRAT 1.8 03/09/2011     05/08/2019    K 4.8 05/08/2019     05/08/2019    CO2 29.0 05/08/2019     Lab Results   Component Value Date    WBC 6.4 05/08/2019    RBC 4.79 05/08/2019    HGB 13.9 05/08/2019    HCT 4 Margaret Cannon, W/O SCOPE      Zhen Goldsmith APRN

## 2019-06-17 ENCOUNTER — TELEPHONE (OUTPATIENT)
Dept: INTERNAL MEDICINE CLINIC | Facility: CLINIC | Age: 74
End: 2019-06-17

## 2019-06-17 DIAGNOSIS — H65.192 OTHER NON-RECURRENT ACUTE NONSUPPURATIVE OTITIS MEDIA OF LEFT EAR: ICD-10-CM

## 2019-06-17 RX ORDER — PREDNISONE 20 MG/1
40 TABLET ORAL DAILY
Qty: 14 TABLET | Refills: 0 | Status: SHIPPED | OUTPATIENT
Start: 2019-06-17 | End: 2019-06-24 | Stop reason: ALTCHOICE

## 2019-06-17 RX ORDER — AMOXICILLIN AND CLAVULANATE POTASSIUM 875; 125 MG/1; MG/1
1 TABLET, FILM COATED ORAL 2 TIMES DAILY
Qty: 14 TABLET | Refills: 0 | Status: SHIPPED | OUTPATIENT
Start: 2019-06-17 | End: 2019-06-24 | Stop reason: ALTCHOICE

## 2019-06-17 NOTE — TELEPHONE ENCOUNTER
Patient called and requested another refill on Amoxicillin to be sent to [facility] on file. Please call because she thinks she may still need more than prescribed.

## 2019-06-17 NOTE — TELEPHONE ENCOUNTER
Spoke with pt she reports still having ear pain and a sore throat. Per Dr. Ffoana Memory extend Augmentin BID for 7 additional days and add on Prednisone 40mg daily x 7 days. D/w pt above recommendations she expressed understanding. Rxs sent.

## 2019-06-24 ENCOUNTER — HOSPITAL ENCOUNTER (OUTPATIENT)
Dept: GENERAL RADIOLOGY | Age: 74
Discharge: HOME | End: 2019-06-24
Attending: INTERNAL MEDICINE
Payer: MEDICARE

## 2019-06-24 ENCOUNTER — OFFICE VISIT (OUTPATIENT)
Dept: INTERNAL MEDICINE CLINIC | Facility: CLINIC | Age: 74
End: 2019-06-24
Payer: MEDICARE

## 2019-06-24 ENCOUNTER — TELEPHONE (OUTPATIENT)
Dept: INTERNAL MEDICINE CLINIC | Facility: CLINIC | Age: 74
End: 2019-06-24

## 2019-06-24 VITALS
OXYGEN SATURATION: 98 % | DIASTOLIC BLOOD PRESSURE: 78 MMHG | HEIGHT: 66 IN | TEMPERATURE: 98 F | HEART RATE: 58 BPM | WEIGHT: 134.5 LBS | BODY MASS INDEX: 21.62 KG/M2 | SYSTOLIC BLOOD PRESSURE: 128 MMHG | RESPIRATION RATE: 18 BRPM

## 2019-06-24 DIAGNOSIS — J44.9 CHRONIC OBSTRUCTIVE PULMONARY DISEASE, UNSPECIFIED COPD TYPE (HCC): Primary | ICD-10-CM

## 2019-06-24 DIAGNOSIS — R06.02 SOB (SHORTNESS OF BREATH) ON EXERTION: ICD-10-CM

## 2019-06-24 DIAGNOSIS — R06.02 SHORTNESS OF BREATH: ICD-10-CM

## 2019-06-24 DIAGNOSIS — H69.82 DYSFUNCTION OF LEFT EUSTACHIAN TUBE: Primary | ICD-10-CM

## 2019-06-24 PROBLEM — J34.0 NASAL SEPTUM ULCERATION: Status: RESOLVED | Noted: 2018-06-14 | Resolved: 2019-06-24

## 2019-06-24 PROCEDURE — 99214 OFFICE O/P EST MOD 30 MIN: CPT | Performed by: INTERNAL MEDICINE

## 2019-06-24 PROCEDURE — 71046 X-RAY EXAM CHEST 2 VIEWS: CPT | Performed by: INTERNAL MEDICINE

## 2019-06-24 RX ORDER — FLUTICASONE PROPIONATE 50 MCG
2 SPRAY, SUSPENSION (ML) NASAL DAILY
Qty: 1 BOTTLE | Refills: 0 | Status: SHIPPED | OUTPATIENT
Start: 2019-06-24 | End: 2020-01-15 | Stop reason: ALTCHOICE

## 2019-06-24 RX ORDER — BUDESONIDE AND FORMOTEROL FUMARATE DIHYDRATE 80; 4.5 UG/1; UG/1
1 AEROSOL RESPIRATORY (INHALATION) 2 TIMES DAILY
Qty: 1 INHALER | Refills: 2 | Status: SHIPPED | OUTPATIENT
Start: 2019-06-24 | End: 2020-01-15 | Stop reason: ALTCHOICE

## 2019-06-24 RX ORDER — DOXYCYCLINE HYCLATE 100 MG
100 TABLET ORAL 2 TIMES DAILY
Qty: 20 TABLET | Refills: 0 | Status: SHIPPED | OUTPATIENT
Start: 2019-06-24 | End: 2020-01-15 | Stop reason: ALTCHOICE

## 2019-06-24 NOTE — TELEPHONE ENCOUNTER
----- Message from Molly Cheadle, MD sent at 6/24/2019  2:47 PM CDT -----  No infection. Copd like findings seen on xray. However dx done through PFTs. Recommend seeing dr Glo Burgess (pulmonary).  Recommend symbicort 80 mg bid for sob related to possible copd

## 2019-06-24 NOTE — PROGRESS NOTES
HPI:    Patient ID: Stephanie Pinzon is a 72year old female. Ear Pain    There is pain in the left ear. This is a recurrent problem. The current episode started 1 to 4 weeks ago. The problem occurs constantly. The problem has been gradually worsening. Allergies: Thiamazole              ITCHING    Comment:Achy, bone pain, difficulty breathing   PHYSICAL EXAM:   Physical Exam   Constitutional: She appears well-developed and well-nourished. No distress.    HENT:   Right Ear: Hearing, tympanic membrane Nare route daily.        Imaging & Referrals:  None       #[id number]

## 2019-06-24 NOTE — TELEPHONE ENCOUNTER
Ear pain, sore throat, hurts when she takes a breath pt has been on 2 rounds of antibiotics.   Appt given for today for re-eval.

## 2019-06-24 NOTE — TELEPHONE ENCOUNTER
Pt states she is following up from last week for a viral infection (breathing issues, ears are sore) Thinks maybe she does need an xray that was recommended , call back

## 2019-06-24 NOTE — TELEPHONE ENCOUNTER
D/w pt results and recommendations. She reports she is established with a surgeon that did surgery on her lungs and will reach out to that provider to see if they will evaluate her for COPD. Rx sent.

## 2019-07-03 ENCOUNTER — APPOINTMENT (OUTPATIENT)
Dept: LAB | Facility: HOSPITAL | Age: 74
End: 2019-07-03
Attending: INTERNAL MEDICINE
Payer: MEDICARE

## 2019-07-03 DIAGNOSIS — R06.02 SOB (SHORTNESS OF BREATH): ICD-10-CM

## 2019-07-03 LAB — D-DIMER: 1.15 UG/ML FEU (ref ?–0.71)

## 2019-07-03 PROCEDURE — 36415 COLL VENOUS BLD VENIPUNCTURE: CPT

## 2019-07-03 PROCEDURE — 85378 FIBRIN DEGRADE SEMIQUANT: CPT

## 2019-07-19 DIAGNOSIS — E78.2 MIXED HYPERLIPIDEMIA: ICD-10-CM

## 2019-07-19 DIAGNOSIS — E03.9 ACQUIRED HYPOTHYROIDISM: ICD-10-CM

## 2019-07-19 RX ORDER — ATORVASTATIN CALCIUM 20 MG/1
20 TABLET, FILM COATED ORAL
Qty: 90 TABLET | Refills: 1 | Status: SHIPPED | OUTPATIENT
Start: 2019-07-19 | End: 2020-01-15

## 2019-07-19 RX ORDER — LEVOTHYROXINE SODIUM 0.15 MG/1
150 TABLET ORAL
Qty: 90 TABLET | Refills: 1 | Status: SHIPPED | OUTPATIENT
Start: 2019-07-19 | End: 2019-10-29

## 2019-10-22 DIAGNOSIS — E03.9 ACQUIRED HYPOTHYROIDISM: ICD-10-CM

## 2019-10-22 RX ORDER — LEVOTHYROXINE SODIUM 0.15 MG/1
150 TABLET ORAL
Qty: 90 TABLET | Refills: 1 | OUTPATIENT
Start: 2019-10-22

## 2019-10-29 DIAGNOSIS — E03.9 ACQUIRED HYPOTHYROIDISM: ICD-10-CM

## 2019-10-29 RX ORDER — LEVOTHYROXINE SODIUM 0.15 MG/1
150 TABLET ORAL
Qty: 90 TABLET | Refills: 1 | Status: SHIPPED | OUTPATIENT
Start: 2019-10-29 | End: 2020-01-15

## 2019-10-29 NOTE — TELEPHONE ENCOUNTER
Fax request from [facility] for a refill on Levothyroxine 150 mcg qty 90 . Please see fax in triage.

## 2019-12-04 ENCOUNTER — HOSPITAL ENCOUNTER (OUTPATIENT)
Dept: ULTRASOUND IMAGING | Facility: HOSPITAL | Age: 74
Discharge: HOME | End: 2019-12-04
Attending: OBSTETRICS & GYNECOLOGY
Payer: MEDICARE

## 2019-12-04 DIAGNOSIS — R19.00 PELVIC MASS: ICD-10-CM

## 2019-12-04 PROCEDURE — 76830 TRANSVAGINAL US NON-OB: CPT | Performed by: OBSTETRICS & GYNECOLOGY

## 2019-12-04 PROCEDURE — 76856 US EXAM PELVIC COMPLETE: CPT | Performed by: OBSTETRICS & GYNECOLOGY

## 2019-12-23 ENCOUNTER — MED REC SCAN ONLY (OUTPATIENT)
Dept: INTERNAL MEDICINE CLINIC | Facility: CLINIC | Age: 74
End: 2019-12-23

## 2020-01-02 ENCOUNTER — HOSPITAL ENCOUNTER (OUTPATIENT)
Dept: CT IMAGING | Facility: HOSPITAL | Age: 75
Discharge: HOME | End: 2020-01-02
Attending: OBSTETRICS & GYNECOLOGY
Payer: MEDICARE

## 2020-01-02 DIAGNOSIS — C34.90 MALIGNANT NEOPLASM OF LUNG, UNSPECIFIED LATERALITY, UNSPECIFIED PART OF LUNG (HCC): ICD-10-CM

## 2020-01-02 DIAGNOSIS — R19.00 PELVIC MASS: ICD-10-CM

## 2020-01-02 LAB — CREAT BLD-MCNC: 1 MG/DL (ref 0.55–1.02)

## 2020-01-02 PROCEDURE — 71260 CT THORAX DX C+: CPT | Performed by: OBSTETRICS & GYNECOLOGY

## 2020-01-02 PROCEDURE — 82565 ASSAY OF CREATININE: CPT

## 2020-01-02 PROCEDURE — 74177 CT ABD & PELVIS W/CONTRAST: CPT | Performed by: OBSTETRICS & GYNECOLOGY

## 2020-01-15 ENCOUNTER — OFFICE VISIT (OUTPATIENT)
Dept: INTERNAL MEDICINE CLINIC | Facility: CLINIC | Age: 75
End: 2020-01-15
Payer: MEDICARE

## 2020-01-15 VITALS
BODY MASS INDEX: 22.5 KG/M2 | WEIGHT: 140 LBS | OXYGEN SATURATION: 98 % | SYSTOLIC BLOOD PRESSURE: 132 MMHG | TEMPERATURE: 98 F | HEIGHT: 66 IN | DIASTOLIC BLOOD PRESSURE: 72 MMHG | RESPIRATION RATE: 14 BRPM | HEART RATE: 78 BPM

## 2020-01-15 DIAGNOSIS — E78.2 MIXED HYPERLIPIDEMIA: ICD-10-CM

## 2020-01-15 DIAGNOSIS — E03.9 ACQUIRED HYPOTHYROIDISM: ICD-10-CM

## 2020-01-15 DIAGNOSIS — N90.89 VULVAR LESION: Primary | ICD-10-CM

## 2020-01-15 PROCEDURE — 99213 OFFICE O/P EST LOW 20 MIN: CPT | Performed by: PHYSICIAN ASSISTANT

## 2020-01-15 RX ORDER — CEPHALEXIN 500 MG/1
500 CAPSULE ORAL 3 TIMES DAILY
Qty: 21 CAPSULE | Refills: 0 | Status: SHIPPED | OUTPATIENT
Start: 2020-01-15 | End: 2020-01-23

## 2020-01-15 RX ORDER — ATORVASTATIN CALCIUM 20 MG/1
20 TABLET, FILM COATED ORAL
Qty: 90 TABLET | Refills: 1 | Status: SHIPPED | OUTPATIENT
Start: 2020-01-15 | End: 2020-09-10

## 2020-01-15 RX ORDER — LEVOTHYROXINE SODIUM 0.15 MG/1
150 TABLET ORAL
Qty: 90 TABLET | Refills: 1 | Status: SHIPPED | OUTPATIENT
Start: 2020-01-15 | End: 2020-09-10

## 2020-01-15 NOTE — PROGRESS NOTES
HPI:    Patient ID: Megha Lucas is a 69year old female. Pt c/o blisters in genital area, x 1 week. Was swollen at first; still has drainage. Noticed at first d/t blood; looked like a large pimple, then later that day noticed pain.  Yellow thin alvarado Thiamazole              ITCHING    Comment:Achy, bone pain, difficulty breathing   PHYSICAL EXAM:   Physical Exam   Constitutional: She is oriented to person, place, and time. She appears well-developed and well-nourished. Neck: Normal range of motion.

## 2020-01-15 NOTE — TELEPHONE ENCOUNTER
Patient is at the pharmacy now, and needs Atorvastatin and Levothyroxine Sodium sent to [facility] in [place].

## 2020-01-23 ENCOUNTER — TELEPHONE (OUTPATIENT)
Dept: INTERNAL MEDICINE CLINIC | Facility: CLINIC | Age: 75
End: 2020-01-23

## 2020-01-23 DIAGNOSIS — N90.89 VULVAR LESION: Primary | ICD-10-CM

## 2020-01-23 RX ORDER — CEPHALEXIN 500 MG/1
500 CAPSULE ORAL 3 TIMES DAILY
Qty: 21 CAPSULE | Refills: 0 | Status: SHIPPED | OUTPATIENT
Start: 2020-01-23 | End: 2020-02-18 | Stop reason: ALTCHOICE

## 2020-01-23 NOTE — TELEPHONE ENCOUNTER
Patient called and stated she was seen for an eye infection, and it has not subsided. Please advise.

## 2020-01-23 NOTE — TELEPHONE ENCOUNTER
PT. Reports seen in office on 1/15/2020 and Dx. With Vulvar lesion. Pt. States, \" Discharge isLess than it was before but still there, and swelling is still there and very uncomfortable to sit and was informed to call back if still having issues. \"

## 2020-01-31 ENCOUNTER — MED REC SCAN ONLY (OUTPATIENT)
Dept: INTERNAL MEDICINE CLINIC | Facility: CLINIC | Age: 75
End: 2020-01-31

## 2020-04-09 ENCOUNTER — VIRTUAL PHONE E/M (OUTPATIENT)
Dept: INTERNAL MEDICINE CLINIC | Facility: CLINIC | Age: 75
End: 2020-04-09
Payer: MEDICARE

## 2020-04-09 DIAGNOSIS — H10.33 ACUTE BACTERIAL CONJUNCTIVITIS OF BOTH EYES: Primary | ICD-10-CM

## 2020-04-09 PROCEDURE — 99441 PHONE E/M BY PHYS 5-10 MIN: CPT | Performed by: NURSE PRACTITIONER

## 2020-04-09 RX ORDER — ERYTHROMYCIN 5 MG/G
OINTMENT OPHTHALMIC
Qty: 3.5 G | Refills: 0 | Status: SHIPPED | OUTPATIENT
Start: 2020-04-09 | End: 2020-08-17 | Stop reason: ALTCHOICE

## 2020-04-09 NOTE — PROGRESS NOTES
Virtual Telephone Check-In    Megha Lucas verbally consents to a Virtual/Telephone Check-In visit on 04/09/20. Patient understands and accepts financial responsibility for any deductible, co-insurance and/or co-pays associated with this service.

## 2020-06-08 ENCOUNTER — VIRTUAL PHONE E/M (OUTPATIENT)
Dept: INTERNAL MEDICINE CLINIC | Facility: CLINIC | Age: 75
End: 2020-06-08
Payer: MEDICARE

## 2020-06-08 ENCOUNTER — TELEPHONE (OUTPATIENT)
Dept: INTERNAL MEDICINE CLINIC | Facility: CLINIC | Age: 75
End: 2020-06-08

## 2020-06-08 DIAGNOSIS — K52.9 CHRONIC DIARRHEA: Primary | ICD-10-CM

## 2020-06-08 PROCEDURE — 99442 PHONE E/M BY PHYS 11-20 MIN: CPT | Performed by: INTERNAL MEDICINE

## 2020-06-08 RX ORDER — CHOLESTYRAMINE 4 G/9G
4 POWDER, FOR SUSPENSION ORAL 2 TIMES DAILY WITH MEALS
Qty: 180 EACH | Refills: 1 | Status: SHIPPED | OUTPATIENT
Start: 2020-06-08 | End: 2020-07-08

## 2020-06-08 NOTE — TELEPHONE ENCOUNTER
Pt looking for an Anti Diarrhea Medication. Interested in getting this medication   chitra avery (SP?) Pt wasn't sure how to spell the medication. Pt is experiencing frequent accidents and she was told by her brother that this works.  They have a

## 2020-06-08 NOTE — PROGRESS NOTES
Virtual Telephone Check-In    Aristides King verbally consents to a Virtual/Telephone Check-In visit on 06/08/20. Patient has been referred to the [facility] website at [email or web address] to review the yearly Consent to Treat document.     Patient under

## 2020-06-08 NOTE — PROGRESS NOTES
HPI:    Patient ID: Cassy Valentine is a 69year old female. Diarrhea    This is a chronic problem. Episode onset: > 20 years. The problem occurs 2 to 4 times per day. The problem has been unchanged. The stool consistency is described as watery.  The p affect. Her behavior is normal.              ASSESSMENT/PLAN:   Chronic diarrhea  (primary encounter diagnosis)  - trial of questran 4 mg bid with meals  No orders of the defined types were placed in this encounter.       Meds This Visit:  Requested Abdiaziz Acosta

## 2020-06-08 NOTE — PATIENT INSTRUCTIONS
How the Colon Works  The colon (large intestine) is a muscular tube that forms the last part of the digestive tract. It absorbs water and stores food waste. The colon is about 5 feet long. The rectum is the last nearly 5 inches of the colon.      How guerrero

## 2020-08-07 ENCOUNTER — TELEPHONE (OUTPATIENT)
Dept: INTERNAL MEDICINE CLINIC | Facility: CLINIC | Age: 75
End: 2020-08-07

## 2020-08-07 DIAGNOSIS — G47.33 OSA (OBSTRUCTIVE SLEEP APNEA): ICD-10-CM

## 2020-08-07 DIAGNOSIS — J44.9 CHRONIC OBSTRUCTIVE PULMONARY DISEASE, UNSPECIFIED COPD TYPE (HCC): Primary | ICD-10-CM

## 2020-08-07 NOTE — TELEPHONE ENCOUNTER
Pt is requesting a different physician   For her sleep study and her CPap machine isnt working anymore. She does not want to be w/ Dr. Mimi Villasenor anymore.      Please call to advise referral

## 2020-08-17 ENCOUNTER — OFFICE VISIT (OUTPATIENT)
Dept: INTERNAL MEDICINE CLINIC | Facility: CLINIC | Age: 75
End: 2020-08-17
Payer: MEDICARE

## 2020-08-17 VITALS
RESPIRATION RATE: 14 BRPM | DIASTOLIC BLOOD PRESSURE: 68 MMHG | BODY MASS INDEX: 22.29 KG/M2 | HEART RATE: 72 BPM | OXYGEN SATURATION: 98 % | HEIGHT: 67 IN | SYSTOLIC BLOOD PRESSURE: 116 MMHG | WEIGHT: 142 LBS | TEMPERATURE: 99 F

## 2020-08-17 DIAGNOSIS — R53.82 CHRONIC FATIGUE: ICD-10-CM

## 2020-08-17 DIAGNOSIS — C34.90 MALIGNANT NEOPLASM OF LUNG, UNSPECIFIED LATERALITY, UNSPECIFIED PART OF LUNG (HCC): ICD-10-CM

## 2020-08-17 DIAGNOSIS — M85.89 OSTEOPENIA OF MULTIPLE SITES: ICD-10-CM

## 2020-08-17 DIAGNOSIS — M47.812 FACET ARTHRITIS OF CERVICAL REGION: ICD-10-CM

## 2020-08-17 DIAGNOSIS — I71.2 THORACIC AORTIC ANEURYSM WITHOUT RUPTURE (HCC): ICD-10-CM

## 2020-08-17 DIAGNOSIS — N18.30 CKD (CHRONIC KIDNEY DISEASE) STAGE 3, GFR 30-59 ML/MIN (HCC): Chronic | ICD-10-CM

## 2020-08-17 DIAGNOSIS — D05.10 DUCTAL CARCINOMA IN SITU (DCIS) OF BREAST, UNSPECIFIED LATERALITY: ICD-10-CM

## 2020-08-17 DIAGNOSIS — Z17.1 MALIGNANT NEOPLASM OF LOWER-OUTER QUADRANT OF RIGHT BREAST OF FEMALE, ESTROGEN RECEPTOR NEGATIVE (HCC): ICD-10-CM

## 2020-08-17 DIAGNOSIS — Z00.00 ENCOUNTER FOR ANNUAL HEALTH EXAMINATION: Primary | ICD-10-CM

## 2020-08-17 DIAGNOSIS — G47.33 OSA (OBSTRUCTIVE SLEEP APNEA): ICD-10-CM

## 2020-08-17 DIAGNOSIS — C50.511 MALIGNANT NEOPLASM OF LOWER-OUTER QUADRANT OF RIGHT BREAST OF FEMALE, ESTROGEN RECEPTOR NEGATIVE (HCC): ICD-10-CM

## 2020-08-17 DIAGNOSIS — E03.9 ACQUIRED HYPOTHYROIDISM: ICD-10-CM

## 2020-08-17 DIAGNOSIS — E78.2 MIXED HYPERLIPIDEMIA: ICD-10-CM

## 2020-08-17 DIAGNOSIS — J44.9 CHRONIC OBSTRUCTIVE PULMONARY DISEASE, UNSPECIFIED COPD TYPE (HCC): ICD-10-CM

## 2020-08-17 PROCEDURE — 99397 PER PM REEVAL EST PAT 65+ YR: CPT | Performed by: PHYSICIAN ASSISTANT

## 2020-08-17 PROCEDURE — 3008F BODY MASS INDEX DOCD: CPT | Performed by: PHYSICIAN ASSISTANT

## 2020-08-17 PROCEDURE — 3074F SYST BP LT 130 MM HG: CPT | Performed by: PHYSICIAN ASSISTANT

## 2020-08-17 PROCEDURE — 96160 PT-FOCUSED HLTH RISK ASSMT: CPT | Performed by: PHYSICIAN ASSISTANT

## 2020-08-17 PROCEDURE — 3078F DIAST BP <80 MM HG: CPT | Performed by: PHYSICIAN ASSISTANT

## 2020-08-17 PROCEDURE — G0439 PPPS, SUBSEQ VISIT: HCPCS | Performed by: PHYSICIAN ASSISTANT

## 2020-08-17 NOTE — PATIENT INSTRUCTIONS
Have labs drawn, fasting 8-10 hours prior (water before is ok).    Follow up with sleep specialist as planned  Return for flu shot in 1-2 months    [address]   Tests on this list are recommended by your physician but may not be date Routine EKG is not a screening covered service except at the Welcome to Medicare Visit    Abdominal aortic aneurysm screening (once between ages 73-68) IPPE only No results found for this or any previous visit.  Limited to patients who meet one of the CHLAMYDIA No flowsheet data found. Screening Mammogram      Mammogram    Recommend Annually to at least age 76, and as needed after 76 There are no preventive care reminders to display for this patient.  Please get this Mammogram regularly   Immunization also has the State forms available on it's website for anyone to review and print using their home computer and printer. (the forms are also available in [address])  www. [email or web address]. org  This link also has information from the [address]

## 2020-08-17 NOTE — PROGRESS NOTES
HPI:   Ritchie Figueroa is a 69year old female who presents for a MA (Medicare Advantage) Supervisit (Once per calendar year). C/o sleeping longer than usual and taking naps, feels fatigued.   Will be following up with sleep specialist, denys mota sche General (Internal Medicine)  Shaq Nunez (Surgery, Thoracic)  Coretta Moy MD (SURGERY, CARDIOVASCULAR)  EMILY Rogers as Breast Navigator (ONCOLOGY)  Jigna Holguin PA-C (Physician Assistant)    Patient Active Problem List:     Aortic aneu surgical history that includes removal of lung,lobectomy (6.2012); other surgical history; other surgical history; supriya biopsy stereo nodule 2 site bilat (cpt=19081/38887) (2005); chemotherapy (1985); lumpectomy left (2005); lumpectomy right (1985); radiati Uncorrected Right Eye Chart Acuity: 20/25   Left Eye Visual Acuity: Uncorrected Left Eye Chart Acuity: 20/25   Both Eyes Visual Acuity: Uncorrected Both Eyes Chart Acuity: 20/25   Able To Tolerate Visual Acuity: Yes      General Appearance:  Alert, coopera 03/25/2020        ASSESSMENT AND OTHER RELEVANT CHRONIC CONDITIONS:   Cristi Steven is a 69year old female who presents for a Medicare Assessment.      PLAN SUMMARY:   Diagnoses and all orders for this visit:    Encounter for annual health examination these issues and agrees to the plan. Reinforced healthy diet, lifestyle, and exercise. Return in about 6 months (around 2/17/2021) for follow-up, or sooner as needed.      Sarah Oakley PA-C, 8/17/2020     General Health     In the past six months, (CPT=77080) 08/19/2016    No flowsheet data found. Pap and Pelvic      Pap: Every 3 yrs age 21-68 or Pap+HPV every 5 yrs age 33-67, age 72 and older at high risk There are no preventive care reminders to display for this patient.  Update [facility]

## 2020-09-10 DIAGNOSIS — E03.9 ACQUIRED HYPOTHYROIDISM: ICD-10-CM

## 2020-09-10 DIAGNOSIS — E78.2 MIXED HYPERLIPIDEMIA: ICD-10-CM

## 2020-09-10 RX ORDER — LEVOTHYROXINE SODIUM 0.15 MG/1
150 TABLET ORAL
Qty: 30 TABLET | Refills: 0 | Status: SHIPPED | OUTPATIENT
Start: 2020-09-10 | End: 2021-01-19

## 2020-09-10 RX ORDER — ATORVASTATIN CALCIUM 20 MG/1
20 TABLET, FILM COATED ORAL
Qty: 90 TABLET | Refills: 0 | Status: SHIPPED | OUTPATIENT
Start: 2020-09-10 | End: 2020-12-09

## 2020-09-10 NOTE — TELEPHONE ENCOUNTER
Pt was looking over her after visit summary from 8/17/20 and noticed it stated the following:    Osteopenia of multiple sites  Facet Arthritis of Cervical region  CKD(Chronic Kidney Disease) Stage 3, GFR 30-59     This brought up great concern for her and

## 2020-09-10 NOTE — TELEPHONE ENCOUNTER
ptcalled and states she is at the pharmacy waiting. Medication fill for short term dose until lab work completed.

## 2020-09-21 ENCOUNTER — LAB ENCOUNTER (OUTPATIENT)
Dept: LAB | Facility: HOSPITAL | Age: 75
End: 2020-09-21
Attending: INTERNAL MEDICINE
Payer: MEDICARE

## 2020-09-21 DIAGNOSIS — C50.511 MALIGNANT NEOPLASM OF LOWER-OUTER QUADRANT OF RIGHT BREAST OF FEMALE, ESTROGEN RECEPTOR NEGATIVE (HCC): ICD-10-CM

## 2020-09-21 DIAGNOSIS — J44.9 CHRONIC OBSTRUCTIVE PULMONARY DISEASE, UNSPECIFIED COPD TYPE (HCC): ICD-10-CM

## 2020-09-21 DIAGNOSIS — Z01.812 PRE-PROCEDURE LAB EXAM: Primary | ICD-10-CM

## 2020-09-21 DIAGNOSIS — Z17.1 MALIGNANT NEOPLASM OF LOWER-OUTER QUADRANT OF RIGHT BREAST OF FEMALE, ESTROGEN RECEPTOR NEGATIVE (HCC): ICD-10-CM

## 2020-09-21 LAB
ALBUMIN SERPL-MCNC: 3.7 G/DL (ref 3.4–5)
ALBUMIN/GLOB SERPL: 0.8 {RATIO} (ref 1–2)
ALP LIVER SERPL-CCNC: 123 U/L
ALT SERPL-CCNC: 51 U/L
ANION GAP SERPL CALC-SCNC: 2 MMOL/L (ref 0–18)
AST SERPL-CCNC: 42 U/L (ref 15–37)
BASOPHILS # BLD AUTO: 0.07 X10(3) UL (ref 0–0.2)
BASOPHILS NFR BLD AUTO: 0.9 %
BILIRUB SERPL-MCNC: 0.5 MG/DL (ref 0.1–2)
BILIRUB UR QL STRIP.AUTO: NEGATIVE
BUN BLD-MCNC: 23 MG/DL (ref 7–18)
BUN/CREAT SERPL: 21.5 (ref 10–20)
CALCIUM BLD-MCNC: 9.7 MG/DL (ref 8.5–10.1)
CHLORIDE SERPL-SCNC: 109 MMOL/L (ref 98–112)
CHOLEST SMN-MCNC: 134 MG/DL (ref ?–200)
CLARITY UR REFRACT.AUTO: CLEAR
CO2 SERPL-SCNC: 29 MMOL/L (ref 21–32)
CREAT BLD-MCNC: 1.07 MG/DL
DEPRECATED RDW RBC AUTO: 47.6 FL (ref 35.1–46.3)
EOSINOPHIL # BLD AUTO: 0.16 X10(3) UL (ref 0–0.7)
EOSINOPHIL NFR BLD AUTO: 2.1 %
ERYTHROCYTE [DISTWIDTH] IN BLOOD BY AUTOMATED COUNT: 14.5 % (ref 11–15)
EST. AVERAGE GLUCOSE BLD GHB EST-MCNC: 126 MG/DL (ref 68–126)
FOLATE SERPL-MCNC: 39.1 NG/ML (ref 8.7–?)
GLOBULIN PLAS-MCNC: 4.4 G/DL (ref 2.8–4.4)
GLUCOSE BLD-MCNC: 94 MG/DL (ref 70–99)
GLUCOSE UR STRIP.AUTO-MCNC: NEGATIVE MG/DL
HBA1C MFR BLD HPLC: 6 % (ref ?–5.7)
HCT VFR BLD AUTO: 45.8 %
HDLC SERPL-MCNC: 82 MG/DL (ref 40–59)
HGB BLD-MCNC: 14.5 G/DL
IMM GRANULOCYTES # BLD AUTO: 0.03 X10(3) UL (ref 0–1)
IMM GRANULOCYTES NFR BLD: 0.4 %
KETONES UR STRIP.AUTO-MCNC: NEGATIVE MG/DL
LDLC SERPL CALC-MCNC: 40 MG/DL (ref ?–100)
LEUKOCYTE ESTERASE UR QL STRIP.AUTO: NEGATIVE
LYMPHOCYTES # BLD AUTO: 1.91 X10(3) UL (ref 1–4)
LYMPHOCYTES NFR BLD AUTO: 25.1 %
M PROTEIN MFR SERPL ELPH: 8.1 G/DL (ref 6.4–8.2)
MCH RBC QN AUTO: 28.4 PG (ref 26–34)
MCHC RBC AUTO-ENTMCNC: 31.7 G/DL (ref 31–37)
MCV RBC AUTO: 89.8 FL
MONOCYTES # BLD AUTO: 0.78 X10(3) UL (ref 0.1–1)
MONOCYTES NFR BLD AUTO: 10.2 %
NEUTROPHILS # BLD AUTO: 4.67 X10 (3) UL (ref 1.5–7.7)
NEUTROPHILS # BLD AUTO: 4.67 X10(3) UL (ref 1.5–7.7)
NEUTROPHILS NFR BLD AUTO: 61.3 %
NITRITE UR QL STRIP.AUTO: NEGATIVE
NONHDLC SERPL-MCNC: 52 MG/DL (ref ?–130)
OSMOLALITY SERPL CALC.SUM OF ELEC: 293 MOSM/KG (ref 275–295)
PATIENT FASTING Y/N/NP: YES
PATIENT FASTING Y/N/NP: YES
PH UR STRIP.AUTO: 5 [PH] (ref 4.5–8)
PLATELET # BLD AUTO: 206 10(3)UL (ref 150–450)
POTASSIUM SERPL-SCNC: 5 MMOL/L (ref 3.5–5.1)
PROT UR STRIP.AUTO-MCNC: NEGATIVE MG/DL
RBC # BLD AUTO: 5.1 X10(6)UL
RBC UR QL AUTO: NEGATIVE
SODIUM SERPL-SCNC: 140 MMOL/L (ref 136–145)
SP GR UR STRIP.AUTO: 1.01 (ref 1–1.03)
TRIGL SERPL-MCNC: 62 MG/DL (ref 30–149)
TSI SER-ACNC: 0.59 MIU/ML (ref 0.36–3.74)
UROBILINOGEN UR STRIP.AUTO-MCNC: <2 MG/DL
VIT B12 SERPL-MCNC: 552 PG/ML (ref 193–986)
VIT D+METAB SERPL-MCNC: 30.1 NG/ML (ref 30–100)
VLDLC SERPL CALC-MCNC: 12 MG/DL (ref 0–30)
WBC # BLD AUTO: 7.6 X10(3) UL (ref 4–11)

## 2020-09-21 PROCEDURE — 36415 COLL VENOUS BLD VENIPUNCTURE: CPT

## 2020-09-21 PROCEDURE — 83036 HEMOGLOBIN GLYCOSYLATED A1C: CPT

## 2020-09-21 PROCEDURE — 80053 COMPREHEN METABOLIC PANEL: CPT

## 2020-09-21 PROCEDURE — 84443 ASSAY THYROID STIM HORMONE: CPT

## 2020-09-21 PROCEDURE — 82607 VITAMIN B-12: CPT

## 2020-09-21 PROCEDURE — 80061 LIPID PANEL: CPT

## 2020-09-21 PROCEDURE — 82306 VITAMIN D 25 HYDROXY: CPT

## 2020-09-21 PROCEDURE — 85025 COMPLETE CBC W/AUTO DIFF WBC: CPT

## 2020-09-21 PROCEDURE — 82746 ASSAY OF FOLIC ACID SERUM: CPT

## 2020-09-21 PROCEDURE — 81003 URINALYSIS AUTO W/O SCOPE: CPT

## 2020-09-22 ENCOUNTER — TELEPHONE (OUTPATIENT)
Dept: INTERNAL MEDICINE CLINIC | Facility: CLINIC | Age: 75
End: 2020-09-22

## 2020-09-22 DIAGNOSIS — R79.89 ELEVATED SERUM CREATININE: Primary | ICD-10-CM

## 2020-09-22 PROBLEM — R73.03 PREDIABETES: Status: ACTIVE | Noted: 2020-09-22

## 2020-09-22 LAB — SARS-COV-2 RNA RESP QL NAA+PROBE: NOT DETECTED

## 2020-09-22 NOTE — TELEPHONE ENCOUNTER
----- Message from Sukhwinder White PA-C sent at 9/22/2020  2:09 PM CDT -----  Please inform pt:  a1c is in prediabetes range but pt's fasting blood sugar is normal. May try lower carb diet to see if energy improves.   Kidney and liver markers are increas

## 2020-09-23 ENCOUNTER — OFFICE VISIT (OUTPATIENT)
Dept: SLEEP CENTER | Age: 75
End: 2020-09-23
Attending: INTERNAL MEDICINE
Payer: MEDICARE

## 2020-09-23 DIAGNOSIS — R06.83 SNORING: ICD-10-CM

## 2020-09-23 DIAGNOSIS — G47.10 HYPERSOMNIA: ICD-10-CM

## 2020-09-23 DIAGNOSIS — C34.12 MALIGNANT NEOPLASM OF UPPER LOBE OF LEFT LUNG (HCC): ICD-10-CM

## 2020-09-23 DIAGNOSIS — G47.33 OSA (OBSTRUCTIVE SLEEP APNEA): ICD-10-CM

## 2020-09-23 PROCEDURE — 95811 POLYSOM 6/>YRS CPAP 4/> PARM: CPT

## 2020-10-29 ENCOUNTER — TELEPHONE (OUTPATIENT)
Dept: INTERNAL MEDICINE CLINIC | Facility: CLINIC | Age: 75
End: 2020-10-29

## 2020-10-29 NOTE — TELEPHONE ENCOUNTER
Pt states over the last 3 days, both eyes have presented as red, with discharge in the mornings. as well itchy, pt states past hx of similar situation 4/2020 of this year and states she still has erythromycin ointment that provided relief before.  Inquires i

## 2020-10-29 NOTE — TELEPHONE ENCOUNTER
Patient says both eyes are red with crust and itchyness. She has some left over medication and wanted to talk to a nurse about what she is experiencing.

## 2020-11-30 ENCOUNTER — TELEPHONE (OUTPATIENT)
Dept: INTERNAL MEDICINE CLINIC | Facility: CLINIC | Age: 75
End: 2020-11-30

## 2020-11-30 NOTE — TELEPHONE ENCOUNTER
Patient called and stated she was given medication for her eyes, and it helped somewhat, and now the problem is back, much stronger. Please advise.

## 2020-11-30 NOTE — TELEPHONE ENCOUNTER
Spoke with pt she has been using Erythromycin oint prescribed in April as she was advised by Ally Pina to use for her current symptoms. Her symptoms are not improving.   She reports b/l eye itching, tearing, redness and crusty in the AM. Appt scheduled for shelley

## 2020-12-01 ENCOUNTER — OFFICE VISIT (OUTPATIENT)
Dept: INTERNAL MEDICINE CLINIC | Facility: CLINIC | Age: 75
End: 2020-12-01
Payer: MEDICARE

## 2020-12-01 VITALS
WEIGHT: 146 LBS | BODY MASS INDEX: 22.91 KG/M2 | HEART RATE: 76 BPM | OXYGEN SATURATION: 97 % | DIASTOLIC BLOOD PRESSURE: 78 MMHG | RESPIRATION RATE: 16 BRPM | TEMPERATURE: 97 F | HEIGHT: 67 IN | SYSTOLIC BLOOD PRESSURE: 126 MMHG

## 2020-12-01 DIAGNOSIS — J30.9 ALLERGIC CONJUNCTIVITIS OF BOTH EYES AND RHINITIS: Primary | ICD-10-CM

## 2020-12-01 DIAGNOSIS — H10.13 ALLERGIC CONJUNCTIVITIS OF BOTH EYES AND RHINITIS: Primary | ICD-10-CM

## 2020-12-01 PROCEDURE — 99213 OFFICE O/P EST LOW 20 MIN: CPT | Performed by: NURSE PRACTITIONER

## 2020-12-01 PROCEDURE — 3074F SYST BP LT 130 MM HG: CPT | Performed by: NURSE PRACTITIONER

## 2020-12-01 PROCEDURE — 3078F DIAST BP <80 MM HG: CPT | Performed by: NURSE PRACTITIONER

## 2020-12-01 PROCEDURE — 3008F BODY MASS INDEX DOCD: CPT | Performed by: NURSE PRACTITIONER

## 2020-12-01 RX ORDER — OLOPATADINE HYDROCHLORIDE 2 MG/ML
1 SOLUTION/ DROPS OPHTHALMIC 2 TIMES DAILY
Qty: 1 BOTTLE | Refills: 3 | Status: SHIPPED | OUTPATIENT
Start: 2020-12-01 | End: 2021-04-23

## 2020-12-01 NOTE — PROGRESS NOTES
HPI:    Patient ID: Yamilet Krishnamurthy is a 70year old female. Patient presents with:  Eye Problem: x6 months: saw Dr Clayton Holt and was given eye solution and had mild improvement, pt eyes itch, burn, red       Eye Problem   Both eyes are affected. This is a breast   • RADIATION RIGHT  1985   • REMOVAL OF LUNG,LOBECTOMY  6.2012    squamous cell ca      Family History   Problem Relation Age of Onset   • Heart Disease Paternal Grandmother    • Lipids Mother         dyslipidemia   • Breast Cancer Self 41     Soci 09/21/2020    GLOBULT 2.6 03/09/2011    GLOBULIN 4.4 09/21/2020    ALBGLOBRAT 1.8 03/09/2011     09/21/2020    K 5.0 09/21/2020     09/21/2020    CO2 29.0 09/21/2020     Lab Results   Component Value Date    WBC 7.6 09/21/2020    RBC 5.10 09/21 sounds normal. No respiratory distress. ASSESSMENT/PLAN:   Diagnoses and all orders for this visit:    Allergic conjunctivitis of both eyes and rhinitis  -     Olopatadine HCl 0.2 % Ophthalmic Solution; Apply 1 drop to eye 2 (two) times a day.

## 2020-12-09 ENCOUNTER — TELEPHONE (OUTPATIENT)
Dept: INTERNAL MEDICINE CLINIC | Facility: CLINIC | Age: 75
End: 2020-12-09

## 2020-12-09 DIAGNOSIS — E78.2 MIXED HYPERLIPIDEMIA: ICD-10-CM

## 2020-12-09 RX ORDER — ATORVASTATIN CALCIUM 20 MG/1
TABLET, FILM COATED ORAL
Qty: 90 TABLET | Refills: 0 | Status: SHIPPED | OUTPATIENT
Start: 2020-12-09 | End: 2021-01-19

## 2020-12-09 RX ORDER — ATORVASTATIN CALCIUM 20 MG/1
20 TABLET, FILM COATED ORAL
Qty: 90 TABLET | Refills: 0 | Status: SHIPPED | OUTPATIENT
Start: 2020-12-09 | End: 2020-12-17

## 2020-12-09 NOTE — TELEPHONE ENCOUNTER
Patient called and requested a refill on   atorvastatin 20 MG Oral Tab 90 tablet     To be sent to [facility] on file.

## 2021-01-14 ENCOUNTER — TELEPHONE (OUTPATIENT)
Dept: INTERNAL MEDICINE CLINIC | Facility: CLINIC | Age: 76
End: 2021-01-14

## 2021-01-14 DIAGNOSIS — J30.9 ALLERGIC CONJUNCTIVITIS OF BOTH EYES AND RHINITIS: Primary | ICD-10-CM

## 2021-01-14 DIAGNOSIS — H10.13 ALLERGIC CONJUNCTIVITIS OF BOTH EYES AND RHINITIS: Primary | ICD-10-CM

## 2021-01-14 NOTE — TELEPHONE ENCOUNTER
Per LA NENA Sheriff  Follow up with MD Kevin Roger  [address] [place] [address]  [place]y, [address] [place] [address] [phone]       Discussed recommendations with patient.  Contact information was provided and  Understandi

## 2021-01-14 NOTE — TELEPHONE ENCOUNTER
Patient called and stated she has seen Bryan Crum and Ranjeet Glover in the past for her eye problem, and this infection is still going on for 6 months, and would like to know what to do, or be seen by a specialist.

## 2021-01-19 DIAGNOSIS — E78.2 MIXED HYPERLIPIDEMIA: ICD-10-CM

## 2021-01-19 DIAGNOSIS — E03.9 ACQUIRED HYPOTHYROIDISM: ICD-10-CM

## 2021-01-19 RX ORDER — LEVOTHYROXINE SODIUM 0.15 MG/1
150 TABLET ORAL
Qty: 90 TABLET | Refills: 0 | Status: SHIPPED | OUTPATIENT
Start: 2021-01-19 | End: 2021-04-19

## 2021-01-19 RX ORDER — ATORVASTATIN CALCIUM 20 MG/1
TABLET, FILM COATED ORAL
Qty: 90 TABLET | Refills: 0 | Status: SHIPPED | OUTPATIENT
Start: 2021-01-19 | End: 2021-04-19

## 2021-02-01 ENCOUNTER — TELEPHONE (OUTPATIENT)
Dept: INTERNAL MEDICINE CLINIC | Facility: CLINIC | Age: 76
End: 2021-02-01

## 2021-02-01 DIAGNOSIS — H10.13 ALLERGIC CONJUNCTIVITIS OF BOTH EYES AND RHINITIS: Primary | ICD-10-CM

## 2021-02-01 DIAGNOSIS — J30.9 ALLERGIC CONJUNCTIVITIS OF BOTH EYES AND RHINITIS: Primary | ICD-10-CM

## 2021-02-01 NOTE — TELEPHONE ENCOUNTER
Pt saw allergist for her eyes but was told it is not allergy related. They recommend patient see an ophthalmologist.  Pt asking for recommendation. If she does not answer phone, please leave a message with ophthalmologist name.

## 2021-02-01 NOTE — TELEPHONE ENCOUNTER
Per KENDALL MarcanoN    Refer to Dr. Mery Kam MD  [facility], [address] [place] [address] Rd    [phone]         Spoke with patient and discussed recommendations, contact information was provided, and unders

## 2021-02-02 ENCOUNTER — TELEPHONE (OUTPATIENT)
Dept: INTERNAL MEDICINE CLINIC | Facility: CLINIC | Age: 76
End: 2021-02-02

## 2021-02-02 DIAGNOSIS — J30.9 ALLERGIC CONJUNCTIVITIS OF BOTH EYES AND RHINITIS: ICD-10-CM

## 2021-02-02 DIAGNOSIS — H10.33 ACUTE BACTERIAL CONJUNCTIVITIS OF BOTH EYES: ICD-10-CM

## 2021-02-02 DIAGNOSIS — H10.13 ALLERGIC CONJUNCTIVITIS OF BOTH EYES AND RHINITIS: ICD-10-CM

## 2021-02-02 NOTE — TELEPHONE ENCOUNTER
Infections in eyes has been going on for 6 months ago. Pt was advised that the Ophthalmologist   Dr. Hossein Palacios needs list of   Medications prescribed for infection.      Ph: [phone]

## 2021-02-02 NOTE — TELEPHONE ENCOUNTER
Pt states Dr Vicente Lorenz needs list of medication from past 6m used for suspected eye infection.   List faxed to [phone]

## 2021-02-06 DIAGNOSIS — Z23 NEED FOR VACCINATION: ICD-10-CM

## 2021-02-09 ENCOUNTER — IMMUNIZATION (OUTPATIENT)
Dept: LAB | Age: 76
End: 2021-02-09
Attending: HOSPITALIST
Payer: MEDICARE

## 2021-02-09 DIAGNOSIS — Z23 NEED FOR VACCINATION: Primary | ICD-10-CM

## 2021-02-09 PROCEDURE — 0001A SARSCOV2 VAC 30MCG/0.3ML IM: CPT

## 2021-02-22 ENCOUNTER — TELEPHONE (OUTPATIENT)
Dept: INTERNAL MEDICINE CLINIC | Facility: CLINIC | Age: 76
End: 2021-02-22

## 2021-02-22 DIAGNOSIS — M54.9 BILATERAL BACK PAIN, UNSPECIFIED BACK LOCATION, UNSPECIFIED CHRONICITY: Primary | ICD-10-CM

## 2021-02-22 RX ORDER — CYCLOBENZAPRINE HCL 10 MG
10 TABLET ORAL 3 TIMES DAILY PRN
Qty: 21 TABLET | Refills: 0 | Status: SHIPPED | OUTPATIENT
Start: 2021-02-22 | End: 2021-03-01

## 2021-02-22 RX ORDER — METHYLPREDNISOLONE 4 MG/1
TABLET ORAL
Qty: 1 KIT | Refills: 0 | Status: SHIPPED | OUTPATIENT
Start: 2021-02-22 | End: 2021-04-15

## 2021-02-22 NOTE — TELEPHONE ENCOUNTER
Req for Prior Auth    [facility]  [place]   Ph: [phone]  Fx: [phone]    cyclobenzaprine 10 MG Oral Tab    Fax in triage

## 2021-02-22 NOTE — TELEPHONE ENCOUNTER
Patient was upset that we were not open on Friday afternoon and the weekend. She has been in bed with back pain. She is requesting some medication. I answered all calls on Friday afternoon.

## 2021-02-22 NOTE — TELEPHONE ENCOUNTER
Patient reports:    - Shoveled  for an hour last week  - Been in bed for the past 4 days  - Bent over at the waist \"even if I try to straighten it, I   Can't because of the pain  - Entire Back  - Issues getting up to go to the restroom  - 10/10  - Ibuprof

## 2021-03-02 ENCOUNTER — IMMUNIZATION (OUTPATIENT)
Dept: LAB | Age: 76
End: 2021-03-02
Attending: HOSPITALIST
Payer: MEDICARE

## 2021-03-02 DIAGNOSIS — Z23 NEED FOR VACCINATION: Primary | ICD-10-CM

## 2021-03-02 PROCEDURE — 0002A SARSCOV2 VAC 30MCG/0.3ML IM: CPT

## 2021-03-19 ENCOUNTER — TELEPHONE (OUTPATIENT)
Dept: INTERNAL MEDICINE CLINIC | Facility: CLINIC | Age: 76
End: 2021-03-19

## 2021-03-19 NOTE — TELEPHONE ENCOUNTER
Patient is at the , and stated she called yesterday 2x, was placed on hold, and no one called her back. This is regarding 2 medications Michelle Montano prescribed for her back, and she took them, but her back is hurting again.  She wants to know if Dr Steve hughes

## 2021-03-19 NOTE — TELEPHONE ENCOUNTER
Spoke with patient directly and apologized for any miscommunication and reassured patient that she will be taken care of and offered orders for physical therapy.  I spoke with patient for about 8 minutes face to face and allowed for direct open communicatio

## 2021-03-22 ENCOUNTER — TELEPHONE (OUTPATIENT)
Dept: INTERNAL MEDICINE CLINIC | Facility: CLINIC | Age: 76
End: 2021-03-22

## 2021-03-22 DIAGNOSIS — M54.9 BILATERAL BACK PAIN, UNSPECIFIED BACK LOCATION, UNSPECIFIED CHRONICITY: Primary | ICD-10-CM

## 2021-03-22 NOTE — TELEPHONE ENCOUNTER
Patient came into office requesting order for Physical Therapy at [facility]. Patient prefers to go to [facility] and not [facility]. Understanding was expressed.    Patient provided the following information:    [facility]  29

## 2021-04-15 ENCOUNTER — APPOINTMENT (OUTPATIENT)
Dept: CT IMAGING | Facility: HOSPITAL | Age: 76
End: 2021-04-15
Attending: EMERGENCY MEDICINE
Payer: MEDICARE

## 2021-04-15 ENCOUNTER — HOSPITAL ENCOUNTER (EMERGENCY)
Facility: HOSPITAL | Age: 76
Discharge: HOME | End: 2021-04-15
Attending: EMERGENCY MEDICINE
Payer: MEDICARE

## 2021-04-15 VITALS
BODY MASS INDEX: 21.19 KG/M2 | SYSTOLIC BLOOD PRESSURE: 128 MMHG | HEART RATE: 70 BPM | RESPIRATION RATE: 15 BRPM | WEIGHT: 135 LBS | OXYGEN SATURATION: 99 % | HEIGHT: 67 IN | DIASTOLIC BLOOD PRESSURE: 85 MMHG | TEMPERATURE: 99 F

## 2021-04-15 DIAGNOSIS — R06.00 DYSPNEA ON EXERTION: Primary | ICD-10-CM

## 2021-04-15 PROCEDURE — 71275 CT ANGIOGRAPHY CHEST: CPT | Performed by: EMERGENCY MEDICINE

## 2021-04-15 PROCEDURE — 85379 FIBRIN DEGRADATION QUANT: CPT | Performed by: EMERGENCY MEDICINE

## 2021-04-15 PROCEDURE — 93005 ELECTROCARDIOGRAM TRACING: CPT

## 2021-04-15 PROCEDURE — 36415 COLL VENOUS BLD VENIPUNCTURE: CPT

## 2021-04-15 PROCEDURE — 84484 ASSAY OF TROPONIN QUANT: CPT | Performed by: EMERGENCY MEDICINE

## 2021-04-15 PROCEDURE — 93010 ELECTROCARDIOGRAM REPORT: CPT

## 2021-04-15 PROCEDURE — 85025 COMPLETE CBC W/AUTO DIFF WBC: CPT | Performed by: EMERGENCY MEDICINE

## 2021-04-15 PROCEDURE — 80053 COMPREHEN METABOLIC PANEL: CPT | Performed by: EMERGENCY MEDICINE

## 2021-04-15 PROCEDURE — 99284 EMERGENCY DEPT VISIT MOD MDM: CPT

## 2021-04-15 PROCEDURE — 99285 EMERGENCY DEPT VISIT HI MDM: CPT

## 2021-04-15 PROCEDURE — 83880 ASSAY OF NATRIURETIC PEPTIDE: CPT | Performed by: EMERGENCY MEDICINE

## 2021-04-15 NOTE — ED INITIAL ASSESSMENT (HPI)
Pt states she flew to visit her [facility] April 1-4th. Pt states since she got home she has become increasingly dyspneic.

## 2021-04-16 ENCOUNTER — TELEPHONE (OUTPATIENT)
Dept: INTERNAL MEDICINE CLINIC | Facility: CLINIC | Age: 76
End: 2021-04-16

## 2021-04-16 NOTE — TELEPHONE ENCOUNTER
Patient was just released from the hospital and has some questions on when/if she needs to follow up. And then plan of care, she would prefer to see Bambi Katz then Dr. Emerson Sykes.

## 2021-04-16 NOTE — ED PROVIDER NOTES
Patient Seen in: [facility] Emergency Department      History   Patient presents with:  Difficulty Breathing    Stated Complaint: last few days issues with breathing.  states her doctor told her to come for adm*    HPI/Subjective:   HPI    This is a BIOPSY STEREO NODULE 2 SITE BILAT (HNM=[id number])  2005   • MASTECTOMY MODIFIED RADICAL Bilateral 10/19/2017    with immediate reconstruction   • OTHER SURGICAL HISTORY      lymphatic surgery   • OTHER SURGICAL HISTORY      Thyroid Surgery   • RADIATION PANEL (14) - Abnormal; Notable for the following components:       Result Value    BUN 23 (*)     BUN/CREA Ratio 23.5 (*)     GFR, Non-African American 57 (*)     Alkaline Phosphatase 145 (*)     Albumin 3.2 (*)     A/G Ratio 0.8 (*)     All other componen 134, unchanged. Scattered tiny calcified granulomas are present, unchanged. The patient appears to be status post left upper lobectomy. Underlying emphysematous changes are present. Pleura:  Normal. No pleural effusion or thickening.  Heart and pericardium: Index Registry. PATIENT STATED HISTORY:(As transcribed by Technologist)  patient presents with PRAKASH   CONTRAST USED:  80cc of Omnipaque 350  FINDINGS:  VASCULATURE:  No visible pulmonary arterial thrombus or attenuation.   Multiple collaterals of the right embolism was noted. She does have mild to moderate emphysematous changes of the lungs. No clear etiology of her dyspnea on exertion. Recommended admission for further evaluation. She states she would much prefer to go home.   We discussed risks of goi

## 2021-04-16 NOTE — TELEPHONE ENCOUNTER
Pt contacted and needs  ER f/u visit for CARRASCO and SOB,    HFU made for 4/16/2021 pt agrees to time and date of visit.

## 2021-04-19 ENCOUNTER — TELEPHONE (OUTPATIENT)
Dept: INTERNAL MEDICINE CLINIC | Facility: CLINIC | Age: 76
End: 2021-04-19

## 2021-04-19 ENCOUNTER — OFFICE VISIT (OUTPATIENT)
Dept: INTERNAL MEDICINE CLINIC | Facility: CLINIC | Age: 76
End: 2021-04-19
Payer: MEDICARE

## 2021-04-19 VITALS
OXYGEN SATURATION: 98 % | BODY MASS INDEX: 21.19 KG/M2 | HEART RATE: 86 BPM | TEMPERATURE: 99 F | SYSTOLIC BLOOD PRESSURE: 124 MMHG | DIASTOLIC BLOOD PRESSURE: 76 MMHG | WEIGHT: 135 LBS | RESPIRATION RATE: 16 BRPM | HEIGHT: 67 IN

## 2021-04-19 DIAGNOSIS — E78.2 MIXED HYPERLIPIDEMIA: ICD-10-CM

## 2021-04-19 DIAGNOSIS — J43.9 PULMONARY EMPHYSEMA, UNSPECIFIED EMPHYSEMA TYPE (HCC): Primary | ICD-10-CM

## 2021-04-19 DIAGNOSIS — E03.9 ACQUIRED HYPOTHYROIDISM: ICD-10-CM

## 2021-04-19 PROCEDURE — 1111F DSCHRG MED/CURRENT MED MERGE: CPT | Performed by: NURSE PRACTITIONER

## 2021-04-19 PROCEDURE — 99214 OFFICE O/P EST MOD 30 MIN: CPT | Performed by: NURSE PRACTITIONER

## 2021-04-19 PROCEDURE — 3008F BODY MASS INDEX DOCD: CPT | Performed by: NURSE PRACTITIONER

## 2021-04-19 PROCEDURE — 3074F SYST BP LT 130 MM HG: CPT | Performed by: NURSE PRACTITIONER

## 2021-04-19 PROCEDURE — 3078F DIAST BP <80 MM HG: CPT | Performed by: NURSE PRACTITIONER

## 2021-04-19 RX ORDER — BUDESONIDE AND FORMOTEROL FUMARATE DIHYDRATE 160; 4.5 UG/1; UG/1
2 AEROSOL RESPIRATORY (INHALATION) 2 TIMES DAILY
Qty: 1 INHALER | Refills: 0 | Status: SHIPPED | OUTPATIENT
Start: 2021-04-19 | End: 2021-04-23

## 2021-04-19 RX ORDER — LEVOTHYROXINE SODIUM 0.15 MG/1
150 TABLET ORAL
Qty: 90 TABLET | Refills: 1 | Status: SHIPPED | OUTPATIENT
Start: 2021-04-19 | End: 2021-10-27

## 2021-04-19 RX ORDER — ATORVASTATIN CALCIUM 20 MG/1
20 TABLET, FILM COATED ORAL DAILY
Qty: 90 TABLET | Refills: 1 | Status: SHIPPED | OUTPATIENT
Start: 2021-04-19 | End: 2021-06-02

## 2021-04-19 NOTE — PROGRESS NOTES
HPI:    Patient ID: Dora Boyer is a 70year old female. Patient presents with:  Hospital F/U      Was seen in ER for concern of PE d/t recent travel and sudden onset SOB. CTA negative. Hx of lung cancer with pneumectomy and tobacco use.  Has consu Smoking status: Former Smoker        Types: Cigarettes        Quit date: 1998        Years since quittin.3      Smokeless tobacco: Never Used    Vaping Use      Vaping Use: Never used    Substance and Sexual Activity      Alcohol use:  Yes        A 04/15/2021    HCT 35.3 04/15/2021    MCV 85.1 04/15/2021    MCH 27.2 04/15/2021    MCHC 32.0 04/15/2021    RDW 14.1 04/15/2021    .0 04/15/2021    MPV 10.6 01/18/2013     Lab Results   Component Value Date    CHOLEST 134 09/21/2020    TRIG 62 09/21/ Dr. Lura Sandifer as scheduled   -     Budesonide-Formoterol Fumarate 160-4.5 MCG/ACT Inhalation Aerosol; Inhale 2 puffs into the lungs 2 (two) times daily. Mixed hyperlipidemia  -     atorvastatin 20 MG Oral Tab; Take 1 tablet (20 mg total) by mouth daily.

## 2021-04-23 ENCOUNTER — TELEPHONE (OUTPATIENT)
Dept: INTERNAL MEDICINE CLINIC | Facility: CLINIC | Age: 76
End: 2021-04-23

## 2021-04-23 ENCOUNTER — HOSPITAL ENCOUNTER (OUTPATIENT)
Facility: HOSPITAL | Age: 76
Setting detail: OBSERVATION
Discharge: HOME | End: 2021-04-24
Attending: EMERGENCY MEDICINE | Admitting: INTERNAL MEDICINE
Payer: MEDICARE

## 2021-04-23 ENCOUNTER — APPOINTMENT (OUTPATIENT)
Dept: GENERAL RADIOLOGY | Facility: HOSPITAL | Age: 76
End: 2021-04-23
Attending: EMERGENCY MEDICINE
Payer: MEDICARE

## 2021-04-23 DIAGNOSIS — R07.9 CHEST PAIN OF UNCERTAIN ETIOLOGY: Primary | ICD-10-CM

## 2021-04-23 PROCEDURE — 71045 X-RAY EXAM CHEST 1 VIEW: CPT | Performed by: EMERGENCY MEDICINE

## 2021-04-23 PROCEDURE — 99220 INITIAL OBSERVATION CARE,LEVL III: CPT | Performed by: HOSPITALIST

## 2021-04-23 PROCEDURE — 99204 OFFICE O/P NEW MOD 45 MIN: CPT | Performed by: INTERNAL MEDICINE

## 2021-04-23 RX ORDER — SODIUM PHOSPHATE, DIBASIC AND SODIUM PHOSPHATE, MONOBASIC 7; 19 G/133ML; G/133ML
1 ENEMA RECTAL ONCE AS NEEDED
Status: DISCONTINUED | OUTPATIENT
Start: 2021-04-23 | End: 2021-04-24

## 2021-04-23 RX ORDER — ASPIRIN 81 MG/1
324 TABLET, CHEWABLE ORAL ONCE
Status: COMPLETED | OUTPATIENT
Start: 2021-04-23 | End: 2021-04-23

## 2021-04-23 RX ORDER — POLYETHYLENE GLYCOL 3350 17 G/17G
17 POWDER, FOR SOLUTION ORAL DAILY PRN
Status: DISCONTINUED | OUTPATIENT
Start: 2021-04-23 | End: 2021-04-24

## 2021-04-23 RX ORDER — BISACODYL 10 MG
10 SUPPOSITORY, RECTAL RECTAL
Status: DISCONTINUED | OUTPATIENT
Start: 2021-04-23 | End: 2021-04-24

## 2021-04-23 RX ORDER — METOCLOPRAMIDE HYDROCHLORIDE 5 MG/ML
10 INJECTION INTRAMUSCULAR; INTRAVENOUS EVERY 8 HOURS PRN
Status: DISCONTINUED | OUTPATIENT
Start: 2021-04-23 | End: 2021-04-24

## 2021-04-23 RX ORDER — DOCUSATE SODIUM 100 MG/1
100 CAPSULE, LIQUID FILLED ORAL 2 TIMES DAILY
Status: DISCONTINUED | OUTPATIENT
Start: 2021-04-23 | End: 2021-04-24

## 2021-04-23 RX ORDER — LEVOTHYROXINE SODIUM 0.15 MG/1
150 TABLET ORAL
Status: DISCONTINUED | OUTPATIENT
Start: 2021-04-23 | End: 2021-04-24

## 2021-04-23 RX ORDER — ONDANSETRON 2 MG/ML
4 INJECTION INTRAMUSCULAR; INTRAVENOUS EVERY 6 HOURS PRN
Status: DISCONTINUED | OUTPATIENT
Start: 2021-04-23 | End: 2021-04-24

## 2021-04-23 RX ORDER — ENOXAPARIN SODIUM 100 MG/ML
40 INJECTION SUBCUTANEOUS DAILY
Status: DISCONTINUED | OUTPATIENT
Start: 2021-04-23 | End: 2021-04-24

## 2021-04-23 RX ORDER — ACETAMINOPHEN 325 MG/1
650 TABLET ORAL EVERY 6 HOURS PRN
Status: DISCONTINUED | OUTPATIENT
Start: 2021-04-23 | End: 2021-04-24

## 2021-04-23 RX ORDER — ATORVASTATIN CALCIUM 20 MG/1
20 TABLET, FILM COATED ORAL DAILY
Status: DISCONTINUED | OUTPATIENT
Start: 2021-04-23 | End: 2021-04-24

## 2021-04-23 NOTE — CONSULTS
[facility]    Report of Consultation    Cristi Steven Patient Status:  Emergency    9/3/1945 MRN [id number]   Location [address] [facility] Attending Kemar Lai MD   Hosp Day # 0 PCP Rima Oconnell MD     Date of Admis LEFT  2005    left breast   • RADIATION RIGHT  1985   • REMOVAL OF LUNG,LOBECTOMY  6.2012    squamous cell ca      Family History   Problem Relation Age of Onset   • Heart Disease Paternal Grandmother    • Lipids Mother         dyslipidemia   • Breast Canc List:     Aortic aneurysm ([address]     Hyperlipidemia     Malignant neoplasm of lower-outer quadrant of right female breast ([address]     Hypothyroidism     Osteopenia of multiple sites     Facet arthritis of cervical region ([address]     TANNRE (obstructive sleep apnea

## 2021-04-23 NOTE — TELEPHONE ENCOUNTER
Pt spoke with Toby DEUTSCH and needs an additional order for PT for neck pain. Ok per Toby DEUTSCH. Order faxed to [facility]   Fx:  [phone]    Pt aware.

## 2021-04-23 NOTE — TELEPHONE ENCOUNTER
Dr. Lucy Musa called me after new consultation with Carmina Neil. I had started her on symbicort 2 puffs bid last week d/t new onset SOB. She reports is \"feels like someone is sitting on my chest\" She was gasping for air while chest pain was occurring.  I called

## 2021-04-23 NOTE — CM/SW NOTE
Asked to see patient to set up outpt stress test and echo as patient does not want to be admitted. Patient states she was sent to the ED for the cardiac testing to be done in the ED.   Not able to be arranged today so patient wanted to be discharged but ladarius

## 2021-04-23 NOTE — ED PROVIDER NOTES
Patient Seen in: [facility] Emergency Department      History   Patient presents with:  Chest Pain Angina    Stated Complaint: chest pain nan     HPI/Subjective:   HPI    Patient is a 82-year-old female comes emergency room for evaluation of chest p MASTECTOMY MODIFIED RADICAL Bilateral 10/19/2017    with immediate reconstruction   • OTHER SURGICAL HISTORY      lymphatic surgery   • OTHER SURGICAL HISTORY      Thyroid Surgery   • RADIATION LEFT  2005    left breast   • RADIATION RIGHT  1985   • Bill Santiago Posterior Tibial pulses present. No clubbing. No cyanosis. No edema. SKIN EXAMINATIoN: Warm and dry with normal appearance. No rashes or lesions. NEUROLOGICAL:  Motor strength intact all groups.   normal sensation, speech intact    ED Course     Labs R approximately 12 mm in the right upper lobe on image 86. An associated 2 mm solid component is stable. There is a 2 mm nodule in the right middle lobe on image 134, unchanged. Scattered tiny calcified granulomas are present, unchanged.  The patient appears techniques were used. Dose information is transmitted to the ACR [employer] of Radiology) NRDR ([address] [place] [address]) which includes the Dose Index Registry.   PATIENT STATED HISTORY:(As transcribed by Technologist)  patient presents with by Technologist)  Patient is having chest pain and difficulty breathing. FINDINGS:  Mildly tortuous thoracic aorta. Normal heart size and pulmonary vascularity. No pleural effusion or pneumothorax. No lobar consolidation.   Degenerative changes in the sp Disposition and Plan     Clinical Impression:  Chest pain of uncertain etiology  (primary encounter diagnosis)     Disposition:  Admit  4/23/2021  3:20 pm    Follow-up:  No follow-up provider specified.         Medications Presc

## 2021-04-23 NOTE — ED INITIAL ASSESSMENT (HPI)
Symbicort 1x week Pt had visit with Dr. Franklyn Huff today. Pt c/o PRAKASH/ cheast heaviness when using symbicort.  Pt to ed for cardiac work up and nuc stress test

## 2021-04-24 ENCOUNTER — APPOINTMENT (OUTPATIENT)
Dept: CV DIAGNOSTICS | Facility: HOSPITAL | Age: 76
End: 2021-04-24
Attending: INTERNAL MEDICINE
Payer: MEDICARE

## 2021-04-24 VITALS
BODY MASS INDEX: 20.93 KG/M2 | HEART RATE: 81 BPM | DIASTOLIC BLOOD PRESSURE: 79 MMHG | RESPIRATION RATE: 16 BRPM | SYSTOLIC BLOOD PRESSURE: 132 MMHG | HEIGHT: 67 IN | WEIGHT: 133.38 LBS | OXYGEN SATURATION: 98 % | TEMPERATURE: 98 F

## 2021-04-24 PROCEDURE — 99214 OFFICE O/P EST MOD 30 MIN: CPT | Performed by: INTERNAL MEDICINE

## 2021-04-24 PROCEDURE — 93306 TTE W/DOPPLER COMPLETE: CPT | Performed by: INTERNAL MEDICINE

## 2021-04-24 PROCEDURE — 78452 HT MUSCLE IMAGE SPECT MULT: CPT | Performed by: INTERNAL MEDICINE

## 2021-04-24 PROCEDURE — 93018 CV STRESS TEST I&R ONLY: CPT | Performed by: INTERNAL MEDICINE

## 2021-04-24 PROCEDURE — 99217 OBSERVATION CARE DISCHARGE: CPT | Performed by: HOSPITALIST

## 2021-04-24 PROCEDURE — 93017 CV STRESS TEST TRACING ONLY: CPT | Performed by: INTERNAL MEDICINE

## 2021-04-24 RX ORDER — ASPIRIN 81 MG/1
81 TABLET, CHEWABLE ORAL DAILY
Status: DISCONTINUED | OUTPATIENT
Start: 2021-04-25 | End: 2021-04-24

## 2021-04-24 NOTE — PROGRESS NOTES
[facility]  Progress Note     Tate Jones Patient Status:  Observation    9/3/1945 MRN [id number]   [facility] 8NE-A Attending [place] [address] [place], [place] [address] Day # 0 PCP Jarred Blakely MD     Chief Complaint: Chest pain    S: COVID19 Not Detected 04/23/2021    COVID19 Not Detected 04/15/2021    COVID19 Not Detected 09/21/2020       Pro-Calcitonin  No results for input(s): PCT in the last 168 hours.     Cardiac  Recent Labs   Lab 04/23/21  1349 04/24/21  0057 04/24/21  0926   TRO

## 2021-04-24 NOTE — PROGRESS NOTES
[facility] Progress Note    Subjective:  She felt some sob during her stress test, yet not as intense as her recent episodes at home.      Objective:  /66 (BP Location: Left arm)   Pulse 83   Temp 98.4 °F (36.9 °C) (Oral)   Resp 18   Ht 170.2 although pulmonary issues seem to be stable with no wheezes or fever. No obvious pulmonary infection. Cardiac wise patient is not fluid overloaded by physical exam.  She denies chest pain or palpitations.     Nuclear stress test was negative for ischemi worsening with restrictive lungs and may need treatment for that as well. Patient will be discharged home this afternoon and outpatient right and left heart catheterization will be arranged through the office.     Discussed with the patient, nurse practi

## 2021-04-24 NOTE — PLAN OF CARE
Received patient, alert and oriented. Up ad christi. Denied chest pain, denied SOB. Trop (-). Safety measures reinforced, call light within reach.  Needs attenfe  Problem: Patient/Family Goals  Goal: Patient/Family Long Term Goal  Description: Patient's Brandy Patriciower

## 2021-04-24 NOTE — PROGRESS NOTES
Received report on, and this Pt., from ER to room 8605 at 1700. Pt., awake, A&Ox4, calm, pleasant and cooperative. Pt., is in SR on Tele monitor, sats greater than 92% on RA, denies any pain. Pt., does report dyspnea after walking in the halls.  Pt., is up

## 2021-04-24 NOTE — H&P
RUBEN HOSPITALIST  History and Physical     Jamesetta Cage Patient Status:  Observation    9/3/1945 MRN [id number]   [facility] 8NE-A Attending Marciana Buerger [address] Day # 0 PCP Kinjal Dillon MD     Chief Complaint: Anai Numbers smokeless tobacco. She reports current alcohol use. She reports that she does not use drugs.     Family History:   Family History   Problem Relation Age of Onset   • Heart Disease Paternal Grandmother    • Lipids Mother         dyslipidemia   • Breast Cance MCV 84.7   .0       Recent Labs   Lab 04/23/21  1349   *   BUN 15   CREATSERUM 1.09*   GFRAA 58*   GFRNAA 50*   CA 9.1   ALB 3.4      K 4.1      CO2 27.0   ALKPHO 133   AST 23   ALT 35   BILT 0.3   TP 7.0       Estimated Creatin

## 2021-04-24 NOTE — PLAN OF CARE
Assumed patient care at 0730 this AM. Patient A&O x4, short term memory loss from head trauma per pt. SPO2 maintained on RA, pt reports continued CARRASCO. NSR on tele, receiving Lovenox subcutaneous for DVT prophylaxis.  Echo and nuclear stress test results pen ADULT  Goal: Verbalizes/displays adequate comfort level or patient's stated pain goal  Description: INTERVENTIONS:  - Encourage pt to monitor pain and request assistance  - Assess pain using appropriate pain scale  - Administer analgesics based on type and to be responsible for managing their own health  - Refer to Case Management Department for coordinating discharge planning if the patient needs post-hospital services based on physician/LIP order or complex needs related to functional status, cognitive samuel

## 2021-04-26 ENCOUNTER — PATIENT OUTREACH (OUTPATIENT)
Dept: CASE MANAGEMENT | Age: 76
End: 2021-04-26

## 2021-04-26 DIAGNOSIS — Z02.9 ENCOUNTERS FOR ADMINISTRATIVE PURPOSE: ICD-10-CM

## 2021-04-26 PROCEDURE — 1111F DSCHRG MED/CURRENT MED MERGE: CPT

## 2021-04-26 NOTE — PROGRESS NOTES
NCM attempted to contact the patient for HFU. No answer and unable to leave a message as the mailbox is full. NCM will try again another time.

## 2021-04-27 ENCOUNTER — MED REC SCAN ONLY (OUTPATIENT)
Dept: INTERNAL MEDICINE CLINIC | Facility: CLINIC | Age: 76
End: 2021-04-27

## 2021-04-27 ENCOUNTER — TELEPHONE (OUTPATIENT)
Dept: INTERNAL MEDICINE CLINIC | Facility: CLINIC | Age: 76
End: 2021-04-27

## 2021-04-27 ENCOUNTER — TELEPHONE (OUTPATIENT)
Dept: CARDIOLOGY | Age: 76
End: 2021-04-27

## 2021-04-27 DIAGNOSIS — I25.118 CORONARY ARTERY DISEASE OF NATIVE HEART WITH STABLE ANGINA PECTORIS, UNSPECIFIED VESSEL OR LESION TYPE (CMD): ICD-10-CM

## 2021-04-27 DIAGNOSIS — G47.33 OSA (OBSTRUCTIVE SLEEP APNEA): ICD-10-CM

## 2021-04-27 DIAGNOSIS — R07.9 CHEST PAIN, UNSPECIFIED TYPE: ICD-10-CM

## 2021-04-27 DIAGNOSIS — R06.09 DYSPNEA ON EXERTION: Primary | ICD-10-CM

## 2021-04-27 NOTE — TELEPHONE ENCOUNTER
SERGEY, Spoke to pt for TCM today. Pt declined to schedule HFU at this time stating that she did not want to come in just yet but will call back when she is ready. TCM/HFU appt recommended by 5/1/21 as pt is a high risk for readmission. Vishnu Hernandez     BOOK BY DATE (l

## 2021-04-27 NOTE — PROGRESS NOTES
Initial Post Discharge Follow Up   Discharge Date: 4/24/21  Contact Date: 4/26/2021    Consent Verification:  Assessment Completed With: Patient  HIPAA Verified?   Yes    Discharge Dx:   Chest pain of uncertain etiology    Was TCC ordered: no           G D/C:   Now that you are home, are there any needs or concerns you need addressed before your next visit with your PCP?  (DME, meds, disease concerns, Etc): No     Follow up appointments:          PCP TCM/HFU appointment: scheduled at D/C within 7-14 days

## 2021-04-28 ENCOUNTER — TELEPHONE (OUTPATIENT)
Dept: CARDIOLOGY | Age: 76
End: 2021-04-28

## 2021-05-03 NOTE — DISCHARGE SUMMARY
R[facility]EN HOSPITALIST  DISCHARGE SUMMARY     Timothy Luke Patient Status:  Observation    9/3/1945 MRN [id number]   [facility] 8NE-A Attending No att. providers found   Hosp Day # 0 PCP Alexandra Gaytan MD     Date of Admission: 2021  D tablet  Refills: 1     Levothyroxine Sodium 150 MCG Tabs  Commonly known as: SYNTHROID      Take 1 tablet (150 mcg total) by mouth before breakfast.   Quantity: 90 tablet  Refills: 1     Tab-A-Luis Maximum Tabs      Take by mouth.    Refills: 0            I

## 2021-05-12 ENCOUNTER — TELEPHONE (OUTPATIENT)
Dept: CARDIOLOGY | Age: 76
End: 2021-05-12

## 2021-06-02 DIAGNOSIS — E78.2 MIXED HYPERLIPIDEMIA: ICD-10-CM

## 2021-06-02 RX ORDER — ATORVASTATIN CALCIUM 20 MG/1
TABLET, FILM COATED ORAL
Qty: 90 TABLET | Refills: 1 | OUTPATIENT
Start: 2021-06-02

## 2021-06-02 RX ORDER — ATORVASTATIN CALCIUM 20 MG/1
20 TABLET, FILM COATED ORAL DAILY
Qty: 90 TABLET | Refills: 1 | Status: SHIPPED | OUTPATIENT
Start: 2021-06-02 | End: 2021-12-26

## 2021-06-02 NOTE — TELEPHONE ENCOUNTER
Patient stopped in asking for new script to be sent for her atorvastatin 20 MG Oral Tab. She asked if it could be sent today. Pharmacy is  [facility]/PHARMACY [address], [place] - [facility] [address]  [facility] [address], [phone], [phone]

## 2021-06-09 ENCOUNTER — TELEPHONE (OUTPATIENT)
Dept: CARDIOLOGY | Age: 76
End: 2021-06-09

## 2021-06-10 ENCOUNTER — TELEPHONE (OUTPATIENT)
Dept: INTERNAL MEDICINE CLINIC | Facility: CLINIC | Age: 76
End: 2021-06-10

## 2021-06-10 DIAGNOSIS — M54.9 BILATERAL BACK PAIN, UNSPECIFIED BACK LOCATION, UNSPECIFIED CHRONICITY: Primary | ICD-10-CM

## 2021-06-10 NOTE — TELEPHONE ENCOUNTER
VM full, no answer, unable to LM. Requires callback. Update: Pt stating she needs an extension to her PT and Dori Robledo needs to approve it, pt would like Dori Robledo to be aware to make sure proper steps are taken.  Dori Robledo aware and agrees to plan, new external referr

## 2021-06-16 ENCOUNTER — TELEPHONE (OUTPATIENT)
Dept: CASE MANAGEMENT | Age: 76
End: 2021-06-16

## 2021-06-16 NOTE — TELEPHONE ENCOUNTER
Pt informed is eligible for 2021 Medicare Advantage Supervisit, states unable to schedule at this time and will call back.

## 2021-07-27 DIAGNOSIS — J43.9 PULMONARY EMPHYSEMA, UNSPECIFIED EMPHYSEMA TYPE (HCC): ICD-10-CM

## 2021-07-27 RX ORDER — BUDESONIDE AND FORMOTEROL FUMARATE DIHYDRATE 160; 4.5 UG/1; UG/1
AEROSOL RESPIRATORY (INHALATION)
Refills: 0 | OUTPATIENT
Start: 2021-07-27

## 2021-08-24 ENCOUNTER — HOSPITAL ENCOUNTER (OUTPATIENT)
Dept: ULTRASOUND IMAGING | Age: 76
Discharge: HOME | End: 2021-08-24
Attending: OBSTETRICS & GYNECOLOGY
Payer: MEDICARE

## 2021-08-24 ENCOUNTER — LAB ENCOUNTER (OUTPATIENT)
Dept: LAB | Age: 76
End: 2021-08-24
Attending: OBSTETRICS & GYNECOLOGY
Payer: MEDICARE

## 2021-08-24 DIAGNOSIS — R19.00 PELVIC MASS: ICD-10-CM

## 2021-08-24 DIAGNOSIS — R19.00 ABDOMINAL LUMP: Primary | ICD-10-CM

## 2021-08-24 DIAGNOSIS — R93.89 THICKENED ENDOMETRIUM: ICD-10-CM

## 2021-08-24 LAB — CANCER AG125 SERPL-ACNC: 3.6 U/ML (ref ?–35)

## 2021-08-24 PROCEDURE — 76856 US EXAM PELVIC COMPLETE: CPT | Performed by: OBSTETRICS & GYNECOLOGY

## 2021-08-24 PROCEDURE — 36415 COLL VENOUS BLD VENIPUNCTURE: CPT

## 2021-08-24 PROCEDURE — 86304 IMMUNOASSAY TUMOR CA 125: CPT

## 2021-08-24 PROCEDURE — 76830 TRANSVAGINAL US NON-OB: CPT | Performed by: OBSTETRICS & GYNECOLOGY

## 2021-08-30 ENCOUNTER — MED REC SCAN ONLY (OUTPATIENT)
Dept: INTERNAL MEDICINE CLINIC | Facility: CLINIC | Age: 76
End: 2021-08-30

## 2021-10-27 DIAGNOSIS — E03.9 ACQUIRED HYPOTHYROIDISM: ICD-10-CM

## 2021-10-27 RX ORDER — LEVOTHYROXINE SODIUM 0.15 MG/1
150 TABLET ORAL
Qty: 90 TABLET | Refills: 1 | Status: SHIPPED | OUTPATIENT
Start: 2021-10-27

## 2021-10-27 NOTE — TELEPHONE ENCOUNTER
Last time medication was refilled 4/19/21  Quantity and number of refills 90w/ 1  Last OV 4/19/21  Next OV 10/1

## 2021-10-27 NOTE — TELEPHONE ENCOUNTER
Received fax refill request for       Levothyroxine Sodium 150 MCG Oral Tab       [id number] [place], [place] - [address] [facility] [address], [phone], [address] [place] [address]

## 2021-11-22 ENCOUNTER — TELEPHONE (OUTPATIENT)
Dept: INTERNAL MEDICINE CLINIC | Facility: CLINIC | Age: 76
End: 2021-11-22

## 2021-11-22 NOTE — TELEPHONE ENCOUNTER
Patient scheduled an appointment with Nolan Kennedy for Wednesday and calls back saying she cant make it.  She wants to the this pre op appointment today, tomorrow or Wednesday after 2pm. I explained to her we do not have any other openings but insists to be seen

## 2021-11-23 ENCOUNTER — OFFICE VISIT (OUTPATIENT)
Dept: INTERNAL MEDICINE CLINIC | Facility: CLINIC | Age: 76
End: 2021-11-23
Payer: MEDICARE

## 2021-11-23 VITALS
WEIGHT: 138 LBS | RESPIRATION RATE: 16 BRPM | OXYGEN SATURATION: 97 % | SYSTOLIC BLOOD PRESSURE: 122 MMHG | DIASTOLIC BLOOD PRESSURE: 74 MMHG | HEART RATE: 73 BPM | HEIGHT: 67 IN | BODY MASS INDEX: 21.66 KG/M2 | TEMPERATURE: 98 F

## 2021-11-23 DIAGNOSIS — Z01.810 PREOPERATIVE CARDIOVASCULAR EXAMINATION: Primary | ICD-10-CM

## 2021-11-23 DIAGNOSIS — N18.31 STAGE 3A CHRONIC KIDNEY DISEASE (HCC): Chronic | ICD-10-CM

## 2021-11-23 DIAGNOSIS — E78.2 MIXED HYPERLIPIDEMIA: ICD-10-CM

## 2021-11-23 DIAGNOSIS — J43.1 PANLOBULAR EMPHYSEMA (HCC): ICD-10-CM

## 2021-11-23 DIAGNOSIS — R93.89 THICKENED ENDOMETRIUM: ICD-10-CM

## 2021-11-23 PROBLEM — M47.812 FACET ARTHRITIS OF CERVICAL REGION: Status: RESOLVED | Noted: 2017-04-21 | Resolved: 2021-11-23

## 2021-11-23 PROBLEM — Z85.118 HISTORY OF LUNG CANCER: Status: ACTIVE | Noted: 2021-11-23

## 2021-11-23 PROBLEM — R07.9 CHEST PAIN OF UNCERTAIN ETIOLOGY: Status: RESOLVED | Noted: 2021-04-23 | Resolved: 2021-11-23

## 2021-11-23 PROBLEM — Z85.3 HISTORY OF RIGHT BREAST CANCER: Status: ACTIVE | Noted: 2021-11-23

## 2021-11-23 PROBLEM — D05.10 DCIS (DUCTAL CARCINOMA IN SITU): Status: RESOLVED | Noted: 2017-10-30 | Resolved: 2021-11-23

## 2021-11-23 PROCEDURE — 99214 OFFICE O/P EST MOD 30 MIN: CPT | Performed by: INTERNAL MEDICINE

## 2021-11-23 PROCEDURE — 3074F SYST BP LT 130 MM HG: CPT | Performed by: INTERNAL MEDICINE

## 2021-11-23 PROCEDURE — 3008F BODY MASS INDEX DOCD: CPT | Performed by: INTERNAL MEDICINE

## 2021-11-23 PROCEDURE — 3078F DIAST BP <80 MM HG: CPT | Performed by: INTERNAL MEDICINE

## 2021-11-24 NOTE — H&P
[address]    History and Physical    Winter Bear Patient Status:  No patient class for patient encounter    9/3/1945 MRN [id number]   Location [facility] Attending No att.  prov small endometrial fluid. Pt now chooses for [facility]      She has had a recent normal stress test:  IMPRESSION:   1.     The electrocardiographic response to exercise was normal.  2.    The wall motion was normal.    3.    The perfusion was normal at stress an surgery   • OTHER SURGICAL HISTORY      Thyroid Surgery   • RADIATION LEFT  2005    left breast   • RADIATION RIGHT  1985   • REMOVAL OF LUNG,LOBECTOMY  6.2012    squamous cell ca       Family History   Problem Relation Age of Onset   • Heart Disease Pater pre-operative physical exam. Patient is to have D&C, to be done by Dr. Amando Kaufman  on 12/2/2021. According to the ACC/AHA guidelines, the patient does not have a history of acute coronary syndrome.  Her estimated perioperative risk for major advers drinks      Comment: 1 drink on New Year's Dayanna    Drug use: No    Allergies/Medications: Allergies:   Tapazole [Thiamazol*    ITCHING  (Not in a hospital admission)          Physical Exam:   Vital Signs:  Blood pressure 122/74, pulse 73, temperature 98. 2

## 2021-11-30 ENCOUNTER — TELEPHONE (OUTPATIENT)
Dept: INTERNAL MEDICINE CLINIC | Facility: CLINIC | Age: 76
End: 2021-11-30

## 2021-11-30 NOTE — TELEPHONE ENCOUNTER
Spoke with pt she had seen Dr. Emerson Sykes last week and noticed on her AVS dx of CKD stage 3. She would like to know what this means. D/w pt patient last year her creatinine was slightly elevated and her GFR was placing her in range for CKD.  Dr. Emerson Sykes reviewed

## 2021-12-01 PROBLEM — N18.30 CKD (CHRONIC KIDNEY DISEASE) STAGE 3, GFR 30-59 ML/MIN (HCC): Chronic | Status: RESOLVED | Noted: 2019-04-30 | Resolved: 2021-12-01

## 2021-12-26 DIAGNOSIS — E78.2 MIXED HYPERLIPIDEMIA: ICD-10-CM

## 2021-12-26 RX ORDER — ATORVASTATIN CALCIUM 20 MG/1
TABLET, FILM COATED ORAL
Qty: 90 TABLET | Refills: 1 | Status: SHIPPED | OUTPATIENT
Start: 2021-12-26

## 2022-03-18 ENCOUNTER — TELEPHONE (OUTPATIENT)
Dept: NEUROLOGY | Facility: CLINIC | Age: 77
End: 2022-03-18

## 2022-03-18 NOTE — TELEPHONE ENCOUNTER
LMTCB with updated ins; called [employer] and under current member ID the policy ended 75/98/02
PRINCIPAL DISCHARGE DIAGNOSIS  Diagnosis: Alcoholic intoxication with complication  Assessment and Plan of Treatment:       SECONDARY DISCHARGE DIAGNOSES  Diagnosis: Intractable vomiting with nausea, unspecified vomiting type  Assessment and Plan of Treatment:

## 2022-04-07 ENCOUNTER — OFFICE VISIT (OUTPATIENT)
Dept: NEUROLOGY | Facility: CLINIC | Age: 77
End: 2022-04-07
Payer: MEDICARE

## 2022-04-07 VITALS
SYSTOLIC BLOOD PRESSURE: 122 MMHG | WEIGHT: 140.81 LBS | BODY MASS INDEX: 22 KG/M2 | HEART RATE: 76 BPM | DIASTOLIC BLOOD PRESSURE: 76 MMHG

## 2022-04-07 DIAGNOSIS — R41.3 POOR SHORT TERM MEMORY: Primary | ICD-10-CM

## 2022-04-07 DIAGNOSIS — F41.9 ANXIETY: ICD-10-CM

## 2022-04-07 DIAGNOSIS — M54.40 LOW BACK PAIN WITH SCIATICA, SCIATICA LATERALITY UNSPECIFIED, UNSPECIFIED BACK PAIN LATERALITY, UNSPECIFIED CHRONICITY: ICD-10-CM

## 2022-04-07 PROBLEM — M54.50 LOW BACK PAIN: Status: ACTIVE | Noted: 2022-04-07

## 2022-04-07 PROCEDURE — 3078F DIAST BP <80 MM HG: CPT | Performed by: OTHER

## 2022-04-07 PROCEDURE — 3074F SYST BP LT 130 MM HG: CPT | Performed by: OTHER

## 2022-04-07 PROCEDURE — 99204 OFFICE O/P NEW MOD 45 MIN: CPT | Performed by: OTHER

## 2022-04-14 ENCOUNTER — HOSPITAL ENCOUNTER (OUTPATIENT)
Dept: MRI IMAGING | Facility: HOSPITAL | Age: 77
Discharge: HOME | End: 2022-04-14
Attending: Other
Payer: MEDICARE

## 2022-04-14 ENCOUNTER — LAB ENCOUNTER (OUTPATIENT)
Dept: LAB | Facility: HOSPITAL | Age: 77
End: 2022-04-14
Attending: INTERNAL MEDICINE
Payer: MEDICARE

## 2022-04-14 DIAGNOSIS — R41.3 POOR SHORT TERM MEMORY: ICD-10-CM

## 2022-04-14 LAB
ERYTHROCYTE [SEDIMENTATION RATE] IN BLOOD: 13 MM/HR
FOLATE SERPL-MCNC: 50.5 NG/ML (ref 8.7–?)
TSI SER-ACNC: 0.88 MIU/ML (ref 0.36–3.74)
VIT B12 SERPL-MCNC: 525 PG/ML (ref 193–986)
VIT D+METAB SERPL-MCNC: 30.5 NG/ML (ref 30–100)

## 2022-04-14 PROCEDURE — 85652 RBC SED RATE AUTOMATED: CPT

## 2022-04-14 PROCEDURE — 82746 ASSAY OF FOLIC ACID SERUM: CPT

## 2022-04-14 PROCEDURE — 70553 MRI BRAIN STEM W/O & W/DYE: CPT | Performed by: OTHER

## 2022-04-14 PROCEDURE — 86038 ANTINUCLEAR ANTIBODIES: CPT

## 2022-04-14 PROCEDURE — 82607 VITAMIN B-12: CPT

## 2022-04-14 PROCEDURE — 84443 ASSAY THYROID STIM HORMONE: CPT

## 2022-04-14 PROCEDURE — A9575 INJ GADOTERATE MEGLUMI 0.1ML: HCPCS | Performed by: OTHER

## 2022-04-14 PROCEDURE — 36415 COLL VENOUS BLD VENIPUNCTURE: CPT

## 2022-04-14 PROCEDURE — 82306 VITAMIN D 25 HYDROXY: CPT

## 2022-04-14 PROCEDURE — 84446 ASSAY OF VITAMIN E: CPT

## 2022-04-15 ENCOUNTER — TELEPHONE (OUTPATIENT)
Dept: NEUROLOGY | Facility: CLINIC | Age: 77
End: 2022-04-15

## 2022-04-15 NOTE — TELEPHONE ENCOUNTER
Message  Received: MD SEBASTIEN Dodge Nurse  MRI brain showed chronic findings, will review at visit,         ----- Message from Shahid Bass MD sent at 4/15/2022  8:26 AM CDT -----  Lab normal - Vit D, B12, TSH, Sed Rate, Folic Acid    Still AWAITING JENNIFER and Vit E.    RN attempted to call patient LAURIE full. LM on patient's son LAURIE ([id number] per [employer]) informing of above. Requested call back with any questions.

## 2022-04-17 LAB
ALPHA-TOCOPHEROL (VIT E) -MG/L: 8.6 MG/L
GAMMA-TOCOPHEROL (VIT E) -MG/L: 0.2 MG/L

## 2022-04-18 LAB — ANA SER QL: NEGATIVE

## 2022-05-06 ENCOUNTER — TELEPHONE (OUTPATIENT)
Dept: INTERNAL MEDICINE CLINIC | Facility: CLINIC | Age: 77
End: 2022-05-06

## 2022-05-06 RX ORDER — CHOLESTYRAMINE LIGHT 4 G/5.7G
4 POWDER, FOR SUSPENSION ORAL DAILY
Qty: 30 EACH | Refills: 0 | Status: SHIPPED | OUTPATIENT
Start: 2022-05-06

## 2022-05-06 NOTE — TELEPHONE ENCOUNTER
SPOKE WITH PT SHE REPORTS SHE HAS HAD DIARRHEAOFF/ON NUMBER OF YEARS. STATES THIS HAS HAPPENED TO SEVERAL FAMILY MEMBERS IN THE PAST TOO. HAS WORSENED AND BECOME MORE FREQUENT THIS WEEK. UNABLE TO LEAVE HOUSE.  TRIED AND FAILED SEVERAL OTC MEDICATIONS AND HAS TAKEN MORE THAT DOSE RECOMMENDED. NO ABD PAIN,  APPETITE LOSS, FEVER, MUCOUS, BLOOD IN STOOL. GRAND DAUGHTER GRADUATING NEXT WEEK AND SHE WILL BE TRAVELING TO [place]. HAS NOT SEEN GI. Per Dr. Nano Payne Cholestyramine 4g daily refer to GI. D/w pt above she expressed understanding. [facility] contact information given to pt. Referral placed.

## 2022-05-18 ENCOUNTER — NURSE ONLY (OUTPATIENT)
Dept: ELECTROPHYSIOLOGY | Facility: HOSPITAL | Age: 77
End: 2022-05-18
Attending: Other
Payer: MEDICARE

## 2022-05-18 ENCOUNTER — TELEPHONE (OUTPATIENT)
Dept: INTERNAL MEDICINE CLINIC | Facility: CLINIC | Age: 77
End: 2022-05-18

## 2022-05-18 DIAGNOSIS — R41.3 POOR SHORT TERM MEMORY: ICD-10-CM

## 2022-05-18 PROCEDURE — 95819 EEG AWAKE AND ASLEEP: CPT | Performed by: OTHER

## 2022-05-18 NOTE — PROCEDURES
[facility]  [address]  [facility] [address] [address]      PATIENT'S NAME: Rohan Espinal   ATTENDING PHYSICIAN: Franco Camacho M.D. PATIENT ACCOUNT #: [de-identified] LOCATION: EEG   [facility]   MEDICAL RECORD #: [id number] YOB: 1945   DATE OF SERVICE: 05/18/2022       ELECTROENCEPHALOGRAM REPORT    DATE OF EXAMINATION:  05/18/2022  AGE: 78 Yrs. SEX: F   EEG #:      1.  10-20 International System. 2.  Bipolar and monopolar recording. 3.  Routine recording (awake and asleep). 4.  Portable - C.E.S.  5.  Impedance - 10 kilohms or less    CLINICAL HISTORY:  This patient presented with decreased short-term memory. She has a hard time remembering names and events. EEG was requested as part of workup. INTERPRETATION:  Recording revealed awake background activity of 8 Hz. Amplitude of 20-50 microvolts was seen in bilateral hemispheres. Background activity was poorly synchronized with some reactivity. Stage 2 sleep was evident due to periodic spindle activity and vertex waves. There is much muscle artifact during the recording, especially in the awake state. However, there is no electrographic evidence of epileptiform activity identified during this recording. Activation procedures were not contributory. IMPRESSION:  This is a normal study for patient's age. There is no electrographic evidence of epileptiform activity identified during this routine EEG recording. However, a normal routine EEG recording does not rule out a clinical history of a seizure disorder. Clinical correlation is indicated.       Dictated By Franco Camacho M.D.  d: 05/18/2022 12:38:51  t: 05/18/2022 12:57:42  [facility] [id number]  /

## 2022-05-18 NOTE — TELEPHONE ENCOUNTER
Patient calling office extremely upset and frustrated. States she has received paperwork from [address] [place] [address] regarding office visit and does not know what to do. Patient has OV scheduled on 6/23/22 with Dr. Cherri Gonzalez. Patient states she has attempted to reach [address] [place] [address] office and no one has gotten back to her. Notified patient I will reach out to [address] [place] [address] and request office to call patient. Contacted [facility] and spoke to Vilma. Informed her of above situation. Vilma states someone will reach out to patient today to address patient's concerns. Veronica Higgins for her assistance. Contacted patient back and informed her someone will be reaching out to patient today to discuss patient's concerns and questions. Patient was thankful and verbalized understanding.

## 2022-05-18 NOTE — TELEPHONE ENCOUNTER
Patient called in wanting to speak with clinical staff about her pre op paper work, Patient does not understand what she should do. Please return patient call. Thank you.

## 2022-05-26 ENCOUNTER — LAB ENCOUNTER (OUTPATIENT)
Dept: LAB | Facility: HOSPITAL | Age: 77
End: 2022-05-26
Payer: MEDICARE

## 2022-05-26 ENCOUNTER — OFFICE VISIT (OUTPATIENT)
Dept: INTERNAL MEDICINE CLINIC | Facility: CLINIC | Age: 77
End: 2022-05-26
Payer: MEDICARE

## 2022-05-26 ENCOUNTER — HOSPITAL ENCOUNTER (OUTPATIENT)
Dept: GENERAL RADIOLOGY | Age: 77
Discharge: HOME | End: 2022-05-26
Payer: MEDICARE

## 2022-05-26 VITALS
SYSTOLIC BLOOD PRESSURE: 112 MMHG | TEMPERATURE: 98 F | HEART RATE: 92 BPM | RESPIRATION RATE: 16 BRPM | WEIGHT: 133 LBS | BODY MASS INDEX: 20.88 KG/M2 | OXYGEN SATURATION: 98 % | HEIGHT: 67 IN | DIASTOLIC BLOOD PRESSURE: 62 MMHG

## 2022-05-26 DIAGNOSIS — J34.89 NASAL CONGESTION WITH RHINORRHEA: ICD-10-CM

## 2022-05-26 DIAGNOSIS — R05.9 COUGH: Primary | ICD-10-CM

## 2022-05-26 DIAGNOSIS — R09.81 NASAL CONGESTION WITH RHINORRHEA: ICD-10-CM

## 2022-05-26 DIAGNOSIS — J43.9 PULMONARY EMPHYSEMA, UNSPECIFIED EMPHYSEMA TYPE (HCC): ICD-10-CM

## 2022-05-26 DIAGNOSIS — R05.9 COUGH: ICD-10-CM

## 2022-05-26 DIAGNOSIS — J44.9 CHRONIC OBSTRUCTIVE PULMONARY DISEASE, UNSPECIFIED COPD TYPE (HCC): ICD-10-CM

## 2022-05-26 PROCEDURE — 3008F BODY MASS INDEX DOCD: CPT

## 2022-05-26 PROCEDURE — 3074F SYST BP LT 130 MM HG: CPT

## 2022-05-26 PROCEDURE — 99213 OFFICE O/P EST LOW 20 MIN: CPT

## 2022-05-26 PROCEDURE — 71046 X-RAY EXAM CHEST 2 VIEWS: CPT

## 2022-05-26 PROCEDURE — 3078F DIAST BP <80 MM HG: CPT

## 2022-05-26 RX ORDER — FLUTICASONE PROPIONATE 50 MCG
2 SPRAY, SUSPENSION (ML) NASAL DAILY
Qty: 1 EACH | Refills: 0 | Status: SHIPPED | OUTPATIENT
Start: 2022-05-26

## 2022-05-26 RX ORDER — BUDESONIDE, GLYCOPYRROLATE, AND FORMOTEROL FUMARATE 160; 9; 4.8 UG/1; UG/1; UG/1
2 AEROSOL, METERED RESPIRATORY (INHALATION) 2 TIMES DAILY
Qty: 1 EACH | Refills: 0 | COMMUNITY
Start: 2022-05-26

## 2022-05-27 ENCOUNTER — TELEPHONE (OUTPATIENT)
Dept: INTERNAL MEDICINE CLINIC | Facility: CLINIC | Age: 77
End: 2022-05-27

## 2022-05-27 LAB — SARS-COV-2 RNA RESP QL NAA+PROBE: NOT DETECTED

## 2022-05-27 NOTE — TELEPHONE ENCOUNTER
Patient calling in very upset due to the fact that she has not received her PCR test results yet, that were completed yesterday. Per patient, has son in law coming in from [place] today and needs to know if she is Covid +. Informed patient that a PCR test typically can take anywhere from 48-72 hours to receive results. Patient states she was not informed of that time period and is upset no one told her. Informed patient to please obtain a home rapid test at either [facility] or [facility]. This RN even searched brands and prices for patient. Notified to please wear a mask and social distance from LORI until Rapid results are obtained. Notified to please contact office with results. Notified patient we will also contact patient once we obtain PCR results. Pt verbalized understanding.

## 2022-05-31 ENCOUNTER — TELEPHONE (OUTPATIENT)
Dept: SURGERY | Facility: CLINIC | Age: 77
End: 2022-05-31

## 2022-05-31 NOTE — TELEPHONE ENCOUNTER
Most recent verbal release does not authorize routine information to be release to pt son. Spoke to pt, gives verbal okay to release below result to her son, Jada Vance. IMPRESSION:  This is a normal study for patient's age. There is no electrographic evidence of epileptiform activity identified during this routine EEG recording. However, a normal routine EEG recording does not rule out a clinical history of a seizure disorder. Clinical correlation is indicated. Dictated By Austen Mclaughlin M.D.       Relayed to Jada Vance, elijah per pt, VU, appt offered and accepted

## 2022-06-06 DIAGNOSIS — R19.7 DIARRHEA, UNSPECIFIED TYPE: ICD-10-CM

## 2022-06-06 RX ORDER — CHOLESTYRAMINE LIGHT 4 G/5.7G
4 POWDER, FOR SUSPENSION ORAL DAILY
Qty: 30 EACH | Refills: 0 | Status: SHIPPED | OUTPATIENT
Start: 2022-06-06

## 2022-06-06 RX ORDER — CHOLESTYRAMINE LIGHT 4 G/5.7G
4 POWDER, FOR SUSPENSION ORAL DAILY
Qty: 30 EACH | Refills: 0 | Status: CANCELLED | OUTPATIENT
Start: 2022-06-06

## 2022-06-06 NOTE — TELEPHONE ENCOUNTER
Pharmacy requesting that medication get resent as Shyla's license is showing invalid. Pharmacy has been updated with correct information. No need to resend.

## 2022-06-06 NOTE — TELEPHONE ENCOUNTER
Refill Req    Prevalite Powder     Please send to [facility] [address], [address] on file.

## 2022-06-07 ENCOUNTER — OFFICE VISIT (OUTPATIENT)
Dept: INTERNAL MEDICINE CLINIC | Facility: CLINIC | Age: 77
End: 2022-06-07
Payer: MEDICARE

## 2022-06-07 VITALS
SYSTOLIC BLOOD PRESSURE: 120 MMHG | HEART RATE: 62 BPM | WEIGHT: 133 LBS | OXYGEN SATURATION: 97 % | BODY MASS INDEX: 20.88 KG/M2 | HEIGHT: 67 IN | TEMPERATURE: 98 F | RESPIRATION RATE: 16 BRPM | DIASTOLIC BLOOD PRESSURE: 70 MMHG

## 2022-06-07 DIAGNOSIS — M46.92 UNSPECIFIED INFLAMMATORY SPONDYLOPATHY, CERVICAL REGION (HCC): ICD-10-CM

## 2022-06-07 DIAGNOSIS — R73.03 PREDIABETES: ICD-10-CM

## 2022-06-07 DIAGNOSIS — I70.0 THORACIC AORTA ATHEROSCLEROSIS (HCC): ICD-10-CM

## 2022-06-07 DIAGNOSIS — Z85.3 HISTORY OF RIGHT BREAST CANCER: ICD-10-CM

## 2022-06-07 DIAGNOSIS — G47.33 OSA (OBSTRUCTIVE SLEEP APNEA): ICD-10-CM

## 2022-06-07 DIAGNOSIS — I71.2 THORACIC AORTIC ANEURYSM WITHOUT RUPTURE (HCC): ICD-10-CM

## 2022-06-07 DIAGNOSIS — J43.1 PANLOBULAR EMPHYSEMA (HCC): ICD-10-CM

## 2022-06-07 DIAGNOSIS — M85.89 OSTEOPENIA OF MULTIPLE SITES: ICD-10-CM

## 2022-06-07 DIAGNOSIS — Z00.00 ANNUAL PHYSICAL EXAM: Primary | ICD-10-CM

## 2022-06-07 DIAGNOSIS — Z00.00 ENCOUNTER FOR ANNUAL HEALTH EXAMINATION: ICD-10-CM

## 2022-06-07 DIAGNOSIS — I77.1 TORTUOUS AORTA (HCC): ICD-10-CM

## 2022-06-07 DIAGNOSIS — E78.2 MIXED HYPERLIPIDEMIA: ICD-10-CM

## 2022-06-07 DIAGNOSIS — Z85.118 HISTORY OF LUNG CANCER: ICD-10-CM

## 2022-06-07 DIAGNOSIS — E03.9 ACQUIRED HYPOTHYROIDISM: ICD-10-CM

## 2022-06-07 PROBLEM — D70.9 NEUTROPENIA, UNSPECIFIED (HCC): Status: ACTIVE | Noted: 2022-06-07

## 2022-06-07 PROBLEM — R41.3 POOR SHORT TERM MEMORY: Status: RESOLVED | Noted: 2022-04-07 | Resolved: 2022-06-07

## 2022-06-07 PROBLEM — D70.9 NEUTROPENIA, UNSPECIFIED (HCC): Status: RESOLVED | Noted: 2022-06-07 | Resolved: 2022-06-07

## 2022-06-07 PROBLEM — F41.9 ANXIETY: Status: RESOLVED | Noted: 2022-04-07 | Resolved: 2022-06-07

## 2022-06-07 PROBLEM — M54.50 LOW BACK PAIN: Status: RESOLVED | Noted: 2022-04-07 | Resolved: 2022-06-07

## 2022-06-07 RX ORDER — IPRATROPIUM BROMIDE AND ALBUTEROL SULFATE 2.5; .5 MG/3ML; MG/3ML
3 SOLUTION RESPIRATORY (INHALATION) ONCE
Status: COMPLETED | OUTPATIENT
Start: 2022-06-07 | End: 2022-06-07

## 2022-06-07 RX ORDER — ALBUTEROL SULFATE 90 UG/1
2 AEROSOL, METERED RESPIRATORY (INHALATION) EVERY 6 HOURS PRN
Qty: 1 EACH | Refills: 1 | Status: SHIPPED | OUTPATIENT
Start: 2022-06-07

## 2022-06-07 RX ORDER — PREDNISONE 20 MG/1
TABLET ORAL
Qty: 21 TABLET | Refills: 0 | Status: SHIPPED | OUTPATIENT
Start: 2022-06-07

## 2022-06-07 RX ORDER — ALBUTEROL SULFATE 2.5 MG/3ML
2.5 SOLUTION RESPIRATORY (INHALATION) ONCE
Status: DISCONTINUED | OUTPATIENT
Start: 2022-06-07 | End: 2022-06-07

## 2022-06-07 RX ORDER — BUDESONIDE, GLYCOPYRROLATE, AND FORMOTEROL FUMARATE 160; 9; 4.8 UG/1; UG/1; UG/1
2 AEROSOL, METERED RESPIRATORY (INHALATION) 2 TIMES DAILY
Qty: 1 EACH | Refills: 1 | COMMUNITY
Start: 2022-06-07

## 2022-06-07 RX ADMIN — IPRATROPIUM BROMIDE AND ALBUTEROL SULFATE 3 ML: 2.5; .5 SOLUTION RESPIRATORY (INHALATION) at 17:02:00

## 2022-06-10 PROBLEM — G31.84 MCI (MILD COGNITIVE IMPAIRMENT): Status: ACTIVE | Noted: 2022-06-10

## 2022-06-10 PROBLEM — R90.89 ABNORMAL FINDING ON MRI OF BRAIN: Status: ACTIVE | Noted: 2022-06-10

## 2022-06-16 ENCOUNTER — TELEPHONE (OUTPATIENT)
Dept: INTERNAL MEDICINE CLINIC | Facility: CLINIC | Age: 77
End: 2022-06-16

## 2022-06-16 DIAGNOSIS — J43.1 PANLOBULAR EMPHYSEMA (HCC): ICD-10-CM

## 2022-06-16 RX ORDER — BUDESONIDE, GLYCOPYRROLATE, AND FORMOTEROL FUMARATE 160; 9; 4.8 UG/1; UG/1; UG/1
2 AEROSOL, METERED RESPIRATORY (INHALATION) 2 TIMES DAILY
Qty: 2 EACH | Refills: 0 | COMMUNITY
Start: 2022-06-16

## 2022-06-16 NOTE — TELEPHONE ENCOUNTER
Patient walking into lobby requesting additional samples of Breztri and refill on prednisone. Two Breztri samples given to patient. Patient notified to follow up with pulmonologist Dr. Danna Dominguez for further recommendation. As previously discussed on 5/26 with CXR results.

## 2022-06-16 NOTE — TELEPHONE ENCOUNTER
Refill Req:     SAMPLE of: budeson-glycopyrrol-formoterol (BREZTRI AEROSPHERE) 160-9-4.8 MCG/ACT Inhalation Aerosol    Please advise when sample is ready.   Pt is in lobby    Refill Req:     Prednisone 20 mg     [facility]

## 2022-06-20 ENCOUNTER — OFFICE VISIT (OUTPATIENT)
Dept: INTERNAL MEDICINE CLINIC | Facility: CLINIC | Age: 77
End: 2022-06-20
Payer: MEDICARE

## 2022-06-20 VITALS
DIASTOLIC BLOOD PRESSURE: 58 MMHG | WEIGHT: 130.81 LBS | HEART RATE: 94 BPM | BODY MASS INDEX: 20.53 KG/M2 | SYSTOLIC BLOOD PRESSURE: 100 MMHG | HEIGHT: 67 IN | OXYGEN SATURATION: 96 %

## 2022-06-20 DIAGNOSIS — G31.84 MCI (MILD COGNITIVE IMPAIRMENT): ICD-10-CM

## 2022-06-20 DIAGNOSIS — R06.02 SHORTNESS OF BREATH: Primary | ICD-10-CM

## 2022-06-20 PROCEDURE — 99214 OFFICE O/P EST MOD 30 MIN: CPT | Performed by: FAMILY MEDICINE

## 2022-06-20 PROCEDURE — 3074F SYST BP LT 130 MM HG: CPT | Performed by: FAMILY MEDICINE

## 2022-06-20 PROCEDURE — 3008F BODY MASS INDEX DOCD: CPT | Performed by: FAMILY MEDICINE

## 2022-06-20 PROCEDURE — 3078F DIAST BP <80 MM HG: CPT | Performed by: FAMILY MEDICINE

## 2022-06-27 ENCOUNTER — LAB ENCOUNTER (OUTPATIENT)
Dept: LAB | Age: 77
End: 2022-06-27
Attending: INTERNAL MEDICINE
Payer: MEDICARE

## 2022-06-27 DIAGNOSIS — R19.7 DIARRHEA, UNSPECIFIED TYPE: ICD-10-CM

## 2022-06-27 LAB — IGA SERPL-MCNC: 167 MG/DL (ref 70–312)

## 2022-06-27 PROCEDURE — 36415 COLL VENOUS BLD VENIPUNCTURE: CPT

## 2022-06-27 PROCEDURE — 86364 TISS TRNSGLTMNASE EA IG CLAS: CPT

## 2022-06-27 PROCEDURE — 82784 ASSAY IGA/IGD/IGG/IGM EACH: CPT

## 2022-06-28 LAB — TTG IGA SER-ACNC: 0.3 U/ML (ref ?–7)

## 2022-07-02 DIAGNOSIS — R19.7 DIARRHEA, UNSPECIFIED TYPE: ICD-10-CM

## 2022-07-05 RX ORDER — CHOLESTYRAMINE 4 G/5.5G
POWDER, FOR SUSPENSION ORAL
Qty: 30 PACKET | Refills: 0 | Status: SHIPPED | OUTPATIENT
Start: 2022-07-05

## 2022-07-19 DIAGNOSIS — J43.1 PANLOBULAR EMPHYSEMA (HCC): ICD-10-CM

## 2022-07-19 DIAGNOSIS — E03.9 ACQUIRED HYPOTHYROIDISM: ICD-10-CM

## 2022-07-19 DIAGNOSIS — E78.2 MIXED HYPERLIPIDEMIA: ICD-10-CM

## 2022-07-19 RX ORDER — ALBUTEROL SULFATE 90 UG/1
2 AEROSOL, METERED RESPIRATORY (INHALATION) EVERY 6 HOURS PRN
Qty: 1 EACH | Refills: 1 | Status: SHIPPED | OUTPATIENT
Start: 2022-07-19

## 2022-07-19 RX ORDER — ATORVASTATIN CALCIUM 20 MG/1
20 TABLET, FILM COATED ORAL NIGHTLY
Qty: 90 TABLET | Refills: 0 | Status: SHIPPED | OUTPATIENT
Start: 2022-07-19

## 2022-07-19 RX ORDER — LEVOTHYROXINE SODIUM 0.15 MG/1
150 TABLET ORAL
Qty: 90 TABLET | Refills: 1 | Status: SHIPPED | OUTPATIENT
Start: 2022-07-19

## 2022-07-19 NOTE — TELEPHONE ENCOUNTER
Last time medication was refilled 4/23/22  Quantity and # of refills 90/0  Last OV 6/7/22  Next OV over due since 7/5/22

## 2022-07-19 NOTE — TELEPHONE ENCOUNTER
Did not contact pharmacy    Is patient out of meds or supply very low: running low    Medication requested:   albuterol (PROAIR HFA) 108 (90 Base) MCG/ACT Inhalation Aero Soln  ATORVASTATIN 20 MG Oral Tab  levothyroxine 150 MCG Oral Tab    Is patient requesting 30 or 90 day supply:  Requesting 2 month supply    Pharmacy name/location: [facility]/pharmacy #[id number] [address] [facility] [address], [phone], [phone]     Is the patient due for appointment: no (if so, please schedule)    Additional notes:  Please indicate what the medication is being used for and will be moving to [place] in the next few weeks, so would like a 2 month supply of meds.

## 2022-08-03 NOTE — TELEPHONE ENCOUNTER
Received fax refill request from [facility] pharmacy for Atorvastatin. Fax is requesting to for [facility] to be [facility] an alternative [facility]. Called patient and unable to leave a message to call back to confirm which pharmacy as we sent this medication 7/19/22, 90 day supply.

## 2022-08-12 DIAGNOSIS — E78.2 MIXED HYPERLIPIDEMIA: ICD-10-CM

## 2022-08-12 RX ORDER — ATORVASTATIN CALCIUM 20 MG/1
20 TABLET, FILM COATED ORAL DAILY
Qty: 90 TABLET | Refills: 0 | Status: SHIPPED | OUTPATIENT
Start: 2022-08-12

## 2022-08-25 ENCOUNTER — ANESTHESIA EVENT (OUTPATIENT)
Dept: ENDOSCOPY | Facility: HOSPITAL | Age: 77
End: 2022-08-25
Payer: MEDICARE

## 2022-08-25 ENCOUNTER — HOSPITAL ENCOUNTER (OUTPATIENT)
Facility: HOSPITAL | Age: 77
Setting detail: HOSPITAL OUTPATIENT SURGERY
Discharge: HOME | End: 2022-08-25
Attending: INTERNAL MEDICINE | Admitting: INTERNAL MEDICINE
Payer: MEDICARE

## 2022-08-25 ENCOUNTER — ANESTHESIA (OUTPATIENT)
Dept: ENDOSCOPY | Facility: HOSPITAL | Age: 77
End: 2022-08-25
Payer: MEDICARE

## 2022-08-25 VITALS
BODY MASS INDEX: 20.33 KG/M2 | DIASTOLIC BLOOD PRESSURE: 69 MMHG | WEIGHT: 128 LBS | HEIGHT: 66.5 IN | RESPIRATION RATE: 18 BRPM | SYSTOLIC BLOOD PRESSURE: 132 MMHG | OXYGEN SATURATION: 97 % | HEART RATE: 60 BPM | TEMPERATURE: 98 F

## 2022-08-25 DIAGNOSIS — Z01.812 ENCOUNTER FOR PREPROCEDURE SCREENING LABORATORY TESTING FOR COVID-19: ICD-10-CM

## 2022-08-25 DIAGNOSIS — R19.7 DIARRHEA, UNSPECIFIED TYPE: ICD-10-CM

## 2022-08-25 DIAGNOSIS — R63.0 DECREASED APPETITE: ICD-10-CM

## 2022-08-25 DIAGNOSIS — Z20.822 ENCOUNTER FOR PREPROCEDURE SCREENING LABORATORY TESTING FOR COVID-19: ICD-10-CM

## 2022-08-25 DIAGNOSIS — R63.4 WEIGHT LOSS: ICD-10-CM

## 2022-08-25 LAB — SARS-COV-2 RNA RESP QL NAA+PROBE: NOT DETECTED

## 2022-08-25 PROCEDURE — 88305 TISSUE EXAM BY PATHOLOGIST: CPT | Performed by: INTERNAL MEDICINE

## 2022-08-25 PROCEDURE — 0DBB8ZX EXCISION OF ILEUM, VIA NATURAL OR ARTIFICIAL OPENING ENDOSCOPIC, DIAGNOSTIC: ICD-10-PCS | Performed by: INTERNAL MEDICINE

## 2022-08-25 PROCEDURE — 0DB88ZX EXCISION OF SMALL INTESTINE, VIA NATURAL OR ARTIFICIAL OPENING ENDOSCOPIC, DIAGNOSTIC: ICD-10-PCS | Performed by: INTERNAL MEDICINE

## 2022-08-25 PROCEDURE — 0DB58ZX EXCISION OF ESOPHAGUS, VIA NATURAL OR ARTIFICIAL OPENING ENDOSCOPIC, DIAGNOSTIC: ICD-10-PCS | Performed by: INTERNAL MEDICINE

## 2022-08-25 PROCEDURE — 0DB68ZX EXCISION OF STOMACH, VIA NATURAL OR ARTIFICIAL OPENING ENDOSCOPIC, DIAGNOSTIC: ICD-10-PCS | Performed by: INTERNAL MEDICINE

## 2022-08-25 PROCEDURE — 0DBE8ZX EXCISION OF LARGE INTESTINE, VIA NATURAL OR ARTIFICIAL OPENING ENDOSCOPIC, DIAGNOSTIC: ICD-10-PCS | Performed by: INTERNAL MEDICINE

## 2022-08-25 RX ORDER — SODIUM CHLORIDE, SODIUM LACTATE, POTASSIUM CHLORIDE, CALCIUM CHLORIDE 600; 310; 30; 20 MG/100ML; MG/100ML; MG/100ML; MG/100ML
INJECTION, SOLUTION INTRAVENOUS CONTINUOUS
Status: DISCONTINUED | OUTPATIENT
Start: 2022-08-25 | End: 2022-08-25

## 2022-08-25 RX ORDER — HYDROMORPHONE HYDROCHLORIDE 1 MG/ML
0.6 INJECTION, SOLUTION INTRAMUSCULAR; INTRAVENOUS; SUBCUTANEOUS EVERY 5 MIN PRN
Status: CANCELLED | OUTPATIENT
Start: 2022-08-25 | End: 2022-08-25

## 2022-08-25 RX ORDER — HYDROMORPHONE HYDROCHLORIDE 1 MG/ML
0.4 INJECTION, SOLUTION INTRAMUSCULAR; INTRAVENOUS; SUBCUTANEOUS EVERY 5 MIN PRN
Status: CANCELLED | OUTPATIENT
Start: 2022-08-25 | End: 2022-08-25

## 2022-08-25 RX ORDER — SODIUM CHLORIDE, SODIUM LACTATE, POTASSIUM CHLORIDE, CALCIUM CHLORIDE 600; 310; 30; 20 MG/100ML; MG/100ML; MG/100ML; MG/100ML
INJECTION, SOLUTION INTRAVENOUS CONTINUOUS
Status: CANCELLED | OUTPATIENT
Start: 2022-08-25

## 2022-08-25 RX ORDER — HYDROMORPHONE HYDROCHLORIDE 1 MG/ML
0.6 INJECTION, SOLUTION INTRAMUSCULAR; INTRAVENOUS; SUBCUTANEOUS EVERY 5 MIN PRN
Status: DISCONTINUED | OUTPATIENT
Start: 2022-08-25 | End: 2022-08-25

## 2022-08-25 RX ORDER — HYDROMORPHONE HYDROCHLORIDE 1 MG/ML
0.2 INJECTION, SOLUTION INTRAMUSCULAR; INTRAVENOUS; SUBCUTANEOUS EVERY 5 MIN PRN
Status: DISCONTINUED | OUTPATIENT
Start: 2022-08-25 | End: 2022-08-25

## 2022-08-25 RX ORDER — HYDROMORPHONE HYDROCHLORIDE 1 MG/ML
0.4 INJECTION, SOLUTION INTRAMUSCULAR; INTRAVENOUS; SUBCUTANEOUS EVERY 5 MIN PRN
Status: DISCONTINUED | OUTPATIENT
Start: 2022-08-25 | End: 2022-08-25

## 2022-08-25 RX ORDER — LIDOCAINE HYDROCHLORIDE 10 MG/ML
INJECTION, SOLUTION EPIDURAL; INFILTRATION; INTRACAUDAL; PERINEURAL AS NEEDED
Status: DISCONTINUED | OUTPATIENT
Start: 2022-08-25 | End: 2022-08-25 | Stop reason: SURG

## 2022-08-25 RX ORDER — HYDROMORPHONE HYDROCHLORIDE 1 MG/ML
0.2 INJECTION, SOLUTION INTRAMUSCULAR; INTRAVENOUS; SUBCUTANEOUS EVERY 5 MIN PRN
Status: CANCELLED | OUTPATIENT
Start: 2022-08-25 | End: 2022-08-25

## 2022-08-25 RX ORDER — NALOXONE HYDROCHLORIDE 0.4 MG/ML
80 INJECTION, SOLUTION INTRAMUSCULAR; INTRAVENOUS; SUBCUTANEOUS AS NEEDED
Status: CANCELLED | OUTPATIENT
Start: 2022-08-25 | End: 2022-08-25

## 2022-08-25 RX ORDER — NALOXONE HYDROCHLORIDE 0.4 MG/ML
80 INJECTION, SOLUTION INTRAMUSCULAR; INTRAVENOUS; SUBCUTANEOUS AS NEEDED
Status: DISCONTINUED | OUTPATIENT
Start: 2022-08-25 | End: 2022-08-25

## 2022-08-25 RX ADMIN — SODIUM CHLORIDE, SODIUM LACTATE, POTASSIUM CHLORIDE, CALCIUM CHLORIDE: 600; 310; 30; 20 INJECTION, SOLUTION INTRAVENOUS at 15:55:00

## 2022-08-25 RX ADMIN — LIDOCAINE HYDROCHLORIDE 25 MG: 10 INJECTION, SOLUTION EPIDURAL; INFILTRATION; INTRACAUDAL; PERINEURAL at 15:55:00

## 2022-08-25 RX ADMIN — SODIUM CHLORIDE, SODIUM LACTATE, POTASSIUM CHLORIDE, CALCIUM CHLORIDE: 600; 310; 30; 20 INJECTION, SOLUTION INTRAVENOUS at 16:28:00

## 2022-08-25 NOTE — OPERATIVE REPORT
[facility] Patient Status:  Hospital Outpatient Surgery    9/3/1945 MRN [id number]   Location [address] [place] [address] Attending Shu Valadez MD   Date 2022 PCP Kaleb Cummins MD     PREOPERATIVE DIAGNOSIS/INDICATION: Diarrhea, weight loss  POSTOPERTATIVE DIAGNOSIS:  Diverticulosis  PROCEDURE PERFORMED: COLONOSCOPY with biopsy  SEDATION: MAC sedation provided by General Anesthesia    TIME OUT WAS PERFORMED    INFORMED CONSENT: Risks, benefits and alternatives to the procedure were explained to the patient including but not limited to bleeding, infection, perforation, adverse drug reactions, pancreatitis and the need for hospitalization and surgery if this occurs, the patient understands and agrees to procedure. PROCEDURE DESCRIPTION: After careful digital rectal examination a pediatric colonoscope was introduced into the patients rectum, advanced pass the recto sigmoid junction, into the descending colon, splenic flexure, transverse colon, hepatic flexure, ascending colon, cecum and the last 5-10cm of the terminal ileum, confirmed by landmarks, including the appendiceal orifice and ileocecal valve. Careful examination of the above described areas was performed on withdrawal of the endoscope. Retroflexion was performed on the rectum. The patient tolerated the procedure well, there were no immediate complication immediately following the procedure, and the patient was transferred to recovery in stable condition. QUALITY OF PREPARATION: [place] Bowel Preparation Scale:            -      Right colon 2, Transverse colon 3, Left colon 3   FINDINGS/THERAPEUTICS:  - TERMINAL ILEUM: Normal s/p random biopsies with cold forceps  - COLON: Moderate diffuse diverticulosis, random mucosal biopsies were performed with cold forceps  RECOMMENDATIONS:   - Post Colonoscopy/biopsy precautions, watch for bleeding, infection, perforation, adverse drug reactions   - Follow biopsies.     [facility] Dottie Muller MD  8/25/2022  4:26 PM

## 2022-08-28 NOTE — PROGRESS NOTES
Date: 2022    To: Temo Adi  : 9/3/1945     I hope this letter finds you doing well. I am writing to inform you of the following: The biopsies obtained at the time of your recent endoscopic procedures were benign and showed no evidence of infection or malignancy. Please call the office at [phone] if there are any questions.     Dean Holden M.D.

## 2022-11-05 DIAGNOSIS — E78.2 MIXED HYPERLIPIDEMIA: ICD-10-CM

## 2022-11-06 NOTE — TELEPHONE ENCOUNTER
Last time medication was refilled 8/12/22  Quantity and # of refills 90/0  Last OV 6/7/22  Next OV No apt scheduled     Pt to RTC in 4 wks, around 7/5/22

## 2022-11-07 RX ORDER — ATORVASTATIN CALCIUM 20 MG/1
TABLET, FILM COATED ORAL
Qty: 90 TABLET | Refills: 0 | Status: SHIPPED | OUTPATIENT
Start: 2022-11-07

## 2022-11-17 ENCOUNTER — TELEPHONE (OUTPATIENT)
Dept: SURGERY | Facility: CLINIC | Age: 77
End: 2022-11-17

## 2022-11-17 NOTE — TELEPHONE ENCOUNTER
Rec'd medical records request from [facility] for all medical records. Original sent to scan/stat. Copy sent to scanning.

## 2024-05-10 NOTE — PATIENT INSTRUCTIONS
Anatomy of the Ear    The ear is a complex and delicate organ. It collects sound waves so you can hear the world around you. The ear also has a second function—it helps you keep your balance. Your ear can be divided into 3 parts.  The outer ear and middle 5

## (undated) DIAGNOSIS — R19.7 DIARRHEA, UNSPECIFIED TYPE: Primary | ICD-10-CM

## (undated) DEVICE — 3M™ RED DOT™ MONITORING ELECTRODE WITH FOAM TAPE AND STICKY GEL, 50/BAG, 20/CASE, 72/PLT 2570: Brand: RED DOT™

## (undated) DEVICE — 1200CC GUARDIAN II: Brand: GUARDIAN

## (undated) DEVICE — FORCEP RADIAL JAW 4

## (undated) DEVICE — ENDOSCOPY PACK - LOWER: Brand: MEDLINE INDUSTRIES, INC.

## (undated) DEVICE — ENDOSCOPY PACK UPPER: Brand: MEDLINE INDUSTRIES, INC.

## (undated) DEVICE — Device: Brand: DEFENDO AIR/WATER/SUCTION AND BIOPSY VALVE

## (undated) DEVICE — FILTERLINE NASAL ADULT O2/CO2

## (undated) NOTE — MR AVS SNAPSHOT
After Visit Summary   5/31/2017    Jyothi Colunga    MRN: [id number]           Visit Information        Provider Department Dept Phone    5/31/2017 10:00 PM Bed1  [facility] [phone]      Allergies as of 5/31/2017  Reviewed on: 4/21/2017

## (undated) NOTE — MR AVS SNAPSHOT
After Visit Summary   6/28/2017    Marcy Navarrete    MRN: [id number]           Visit Information     Date & Time  6/28/2017 10:00 PM Provider  [address] Dept[address]  Phone  [phone]      Allergies as o

## (undated) NOTE — MR AVS SNAPSHOT
[address]  [address] [facility] [address] [phone]               Thank you for choosing us for your health care visit with Janett Stahl MD.  We are glad to serve you and happy to provide you with this s · Relax and repeat the exercise with your right knee. · Do 10 of these exercises for each leg. · Repeat hugging both knees to your chest at the same time.   Building lower back strength  Start your exercise routine with 10 to 30 minutes a day, 1 to 3 time 2. Prone lumbar extension: Lie face down, arms extended overhead, palms on the floor. At the same time, raise your right arm and left leg as high as comfortably possible. Hold for 10 seconds and slowly return to start.  Repeat with left arm and right leg, a © 2273-0422 The [address] [place] [address], [address]. All rights reserved. This information is not intended as a substitute for professional medical care. Always follow your healthcare professional's instructions.         Back Ex You should feel comfortable and relaxed in this position:  · Start by tightening your abdominal muscles. · Lift one bent knee off the floor 2 to 4 inches. · Hold for 10 seconds. Return to start position. · Repeat 3 times. · Switch legs.   Date Last Revi These medications were sent to [facility] [address], [place] - [address] [facility] [address], [phone], [address] [facility] [address], [place] [address]     Phone:  [phone]    - Cyclobenzaprine HCl 10 MG

## (undated) NOTE — LETTER
[address] [address]  [phone]                04/23/21      Stephanie [place]  9/3/1945      PT evaluate and treat      Dx: Neck Pain (M54.2)          Fahad Garcia,

## (undated) NOTE — MR AVS SNAPSHOT
[address]  [address] [facility] [address] [phone]               Thank you for choosing us for your health care visit with Mercy Michaels PA-C.   We are glad to serve you and happy to provide you with Phone:  [phone]    Diagnoses:  Sleep apnea, unspecified type   Order:  Op Referral To Diagnostic Sleep Study          Referral Orders      Normal Orders This Visit    OP REFERRAL TO DIAGNOSTIC SLEEP STUDY [[id number] CUSTOM]  Order #:  [id number] covered, or covered at this frequency, by your insurer. Please check with your insurance carrier before scheduling to verify coverage.    PREVENTATIVE SERVICES  INDICATIONS AND SCHEDULE Internal Lab or Procedure External Lab or Procedure   Diabetes Screenin their lifetime   • Anyone with a family history    Colorectal Cancer Screening  Covered up to Age 76     Colonoscopy Screen   Covered every 10 years- more often if abnormal Colonoscopy,10 Years due on 09/03/1997  Colonoscopy,10 Years due on 07/08/2026 Reba Holland -FLU VAC NO PRSV 4 GHULAM 3 YRS+    Please get every year    Pneumococcal 13 (Prevnar)  Covered Once after 65   Orders placed or performed in visit on 01/18/16  -PNEUMOCOCCAL VACC, 13 GHULAM IM    Please get once after your 65th birthday    Pneumococcal 23 (Pneu Tapazole [Methimazole] Unknown    Tabs; achy, bone pain and difficulty breathing                Today's Vital Signs     BP Pulse Temp Height Weight BMI    110/78 mmHg 80 96.5 °F (35.8 °C) (Oral) 65.5\" 170 lb 27.85 kg/m2         Current Medications Eat plenty of low-fat dairy products High fat meats and dairy   Choose whole grain products Foods high in sodium   Water is best for hydration Fast food.    Eat at home when possible     Tips for increasing your physical activity – Adults who are physically

## (undated) NOTE — LETTER
02/02/21  Maggy Pabon  9/3/1945      Past history of antibiotic use for suspected eye infection  -erythromycin 5 mg opthalmic ointment 4/1/2020  - Olopatadine Hcl 0.2%  opthalmic ointment  12/1/2020      Starling Boas

## (undated) NOTE — LETTER
04/25/18        Shayy Beatty  9/3/1945      To Whom it May Concern,    The above patient is diagnosed with Obstructive Apnea (G47.33). She is prescribed a CPAP machine for use while sleeping.  It is medically necessary that the patient carry on this de